# Patient Record
Sex: FEMALE | Race: WHITE | Employment: UNEMPLOYED | URBAN - METROPOLITAN AREA
[De-identification: names, ages, dates, MRNs, and addresses within clinical notes are randomized per-mention and may not be internally consistent; named-entity substitution may affect disease eponyms.]

---

## 2018-07-12 ENCOUNTER — OFFICE VISIT (OUTPATIENT)
Dept: ORTHOPEDIC SURGERY | Age: 63
End: 2018-07-12

## 2018-07-12 VITALS
WEIGHT: 205 LBS | BODY MASS INDEX: 29.35 KG/M2 | SYSTOLIC BLOOD PRESSURE: 148 MMHG | HEIGHT: 70 IN | HEART RATE: 76 BPM | DIASTOLIC BLOOD PRESSURE: 89 MMHG

## 2018-07-12 DIAGNOSIS — M17.0 BILATERAL PRIMARY OSTEOARTHRITIS OF KNEE: ICD-10-CM

## 2018-07-12 DIAGNOSIS — M25.562 LEFT KNEE PAIN, UNSPECIFIED CHRONICITY: Primary | ICD-10-CM

## 2018-07-12 PROCEDURE — 99203 OFFICE O/P NEW LOW 30 MIN: CPT | Performed by: ORTHOPAEDIC SURGERY

## 2018-07-12 RX ORDER — MAGNESIUM OXIDE 400 MG/1
400 TABLET ORAL DAILY
COMMUNITY
End: 2018-11-27 | Stop reason: ALTCHOICE

## 2018-07-12 NOTE — PROGRESS NOTES
Review of Systems   Constitutional:        Weight gain     HENT:        Sinus     Cardiovascular: Positive for leg swelling. Gastrointestinal:        Hemorrhoids     Genitourinary:        Incontinence     Musculoskeletal: Positive for joint pain and neck pain. Neurological: Positive for headaches. Chronic     All other systems reviewed and are negative.

## 2018-07-12 NOTE — PROGRESS NOTES
as on the form. Past History:  History reviewed. No pertinent past medical history. Past Surgical History:   Procedure Laterality Date    HYSTERECTOMY, VAGINAL      KIDNEY SURGERY Left      No current outpatient prescriptions on file prior to visit. No current facility-administered medications on file prior to visit. Social History     Social History    Marital status:      Spouse name: N/A    Number of children: N/A    Years of education: N/A     Occupational History    Not on file. Social History Main Topics    Smoking status: Never Smoker    Smokeless tobacco: Never Used    Alcohol use Not on file    Drug use: Unknown    Sexual activity: Not on file     Other Topics Concern    Not on file     Social History Narrative    No narrative on file     History reviewed. No pertinent family history. Current Medications:    Current Outpatient Prescriptions   Medication Sig Dispense Refill    Naproxen Sodium (ALEVE PO) Take by mouth      Cetirizine HCl (ZYRTEC ALLERGY PO) Take 10 mg by mouth      magnesium oxide (MAG-OX) 400 MG tablet Take 400 mg by mouth daily      Potassium 99 MG TABS Take by mouth      Glucosamine-Chondroit-Vit C-Mn (GLUCOSAMINE 1500 COMPLEX PO) Take by mouth       No current facility-administered medications for this visit. Allergies:  No Known Allergies    Physical Exam:  Vitals:    07/12/18 1426   BP: (!) 148/89   Pulse: 76     General: Frantz Ramos is a 66y.o. year old female who is sitting comfortably in our office in no acute distress. Alert and oriented. Neuro: alert. oriented  Eyes: Extra-ocular muscles intact  Mouth: Oral mucosa moist. No perioral lesions  Pulm: Respirations unlabored and regular. Heart: Regular rate and rhythm   Skin: warm, well perfused    Left Knee Exam:   Overall alignment is appreciated. Within normal limits.      Gait/Alignment: 8° valgus alignment that corrects with stress      Patella tracking: No J sign. answered.       Board Certified Orthopaedic Surgeon  [facility] and [facility] [address]  President and [address] [employer]  Professor of [address]

## 2018-07-16 ENCOUNTER — TELEPHONE (OUTPATIENT)
Dept: ORTHOPEDIC SURGERY | Age: 63
End: 2018-07-16

## 2018-07-24 ENCOUNTER — PRE-EVALUATION (OUTPATIENT)
Dept: ORTHOPEDIC SURGERY | Age: 63
End: 2018-07-24

## 2018-07-24 ENCOUNTER — OFFICE VISIT (OUTPATIENT)
Dept: ORTHOPEDIC SURGERY | Age: 63
End: 2018-07-24

## 2018-07-24 VITALS
HEIGHT: 70 IN | DIASTOLIC BLOOD PRESSURE: 78 MMHG | SYSTOLIC BLOOD PRESSURE: 126 MMHG | HEART RATE: 82 BPM | BODY MASS INDEX: 29.35 KG/M2 | WEIGHT: 205 LBS

## 2018-07-24 DIAGNOSIS — M17.0 BILATERAL PRIMARY OSTEOARTHRITIS OF KNEE: Primary | ICD-10-CM

## 2018-07-24 DIAGNOSIS — M25.562 LEFT KNEE PAIN, UNSPECIFIED CHRONICITY: ICD-10-CM

## 2018-07-24 PROCEDURE — 99214 OFFICE O/P EST MOD 30 MIN: CPT | Performed by: ORTHOPAEDIC SURGERY

## 2018-07-24 PROCEDURE — APPSS30 APP SPLIT SHARED TIME 16-30 MINUTES: Performed by: PHYSICIAN ASSISTANT

## 2018-07-24 PROCEDURE — APPSS15 APP SPLIT SHARED TIME 0-15 MINUTES: Performed by: PHYSICIAN ASSISTANT

## 2018-07-31 ENCOUNTER — HOSPITAL ENCOUNTER (OUTPATIENT)
Age: 63
Discharge: HOME | End: 2018-07-31
Payer: COMMERCIAL

## 2018-07-31 ENCOUNTER — HOSPITAL ENCOUNTER (OUTPATIENT)
Dept: GENERAL RADIOLOGY | Age: 63
Discharge: HOME | End: 2018-07-31
Payer: COMMERCIAL

## 2018-07-31 DIAGNOSIS — M17.12 ARTHRITIS OF KNEE, LEFT: ICD-10-CM

## 2018-07-31 PROCEDURE — 77073 BONE LENGTH STUDIES: CPT

## 2018-08-01 DIAGNOSIS — M17.12 PRIMARY OSTEOARTHRITIS OF LEFT KNEE: Primary | ICD-10-CM

## 2018-08-02 ENCOUNTER — TELEPHONE (OUTPATIENT)
Dept: ORTHOPEDIC SURGERY | Age: 63
End: 2018-08-02

## 2018-08-22 ENCOUNTER — HOSPITAL ENCOUNTER (OUTPATIENT)
Dept: PREADMISSION TESTING | Age: 63
Discharge: HOME | End: 2018-08-26
Payer: COMMERCIAL

## 2018-08-22 VITALS
BODY MASS INDEX: 28.63 KG/M2 | HEIGHT: 70 IN | RESPIRATION RATE: 16 BRPM | HEART RATE: 76 BPM | SYSTOLIC BLOOD PRESSURE: 134 MMHG | TEMPERATURE: 97.2 F | OXYGEN SATURATION: 97 % | WEIGHT: 200 LBS | DIASTOLIC BLOOD PRESSURE: 83 MMHG

## 2018-08-22 LAB
ALBUMIN SERPL-MCNC: 4.4 G/DL (ref 3.4–5)
ALP BLD-CCNC: 158 U/L (ref 40–129)
ALT SERPL-CCNC: 23 U/L (ref 10–40)
ANION GAP SERPL CALCULATED.3IONS-SCNC: 9 MMOL/L (ref 3–16)
APTT: 32 SEC (ref 26–36)
AST SERPL-CCNC: 19 U/L (ref 15–37)
BILIRUB SERPL-MCNC: 0.3 MG/DL (ref 0–1)
BILIRUBIN DIRECT: <0.2 MG/DL (ref 0–0.3)
BILIRUBIN, INDIRECT: ABNORMAL MG/DL (ref 0–1)
BUN BLDV-MCNC: 13 MG/DL (ref 7–20)
CALCIUM SERPL-MCNC: 9.8 MG/DL (ref 8.3–10.6)
CHLORIDE BLD-SCNC: 104 MMOL/L (ref 99–110)
CO2: 27 MMOL/L (ref 21–32)
CREAT SERPL-MCNC: 0.7 MG/DL (ref 0.6–1.2)
EKG ATRIAL RATE: 74 BPM
EKG DIAGNOSIS: NORMAL
EKG P AXIS: 52 DEGREES
EKG P-R INTERVAL: 154 MS
EKG Q-T INTERVAL: 398 MS
EKG QRS DURATION: 82 MS
EKG QTC CALCULATION (BAZETT): 441 MS
EKG R AXIS: 22 DEGREES
EKG T AXIS: 40 DEGREES
EKG VENTRICULAR RATE: 74 BPM
GFR AFRICAN AMERICAN: >60
GFR NON-AFRICAN AMERICAN: >60
GLUCOSE BLD-MCNC: 82 MG/DL (ref 70–99)
INR BLD: 0.89 (ref 0.86–1.14)
POTASSIUM SERPL-SCNC: 4.5 MMOL/L (ref 3.5–5.1)
PROTHROMBIN TIME: 10.2 SEC (ref 9.8–13)
SODIUM BLD-SCNC: 140 MMOL/L (ref 136–145)
TOTAL PROTEIN: 6.9 G/DL (ref 6.4–8.2)

## 2018-08-22 PROCEDURE — 80048 BASIC METABOLIC PNL TOTAL CA: CPT

## 2018-08-22 PROCEDURE — 85730 THROMBOPLASTIN TIME PARTIAL: CPT

## 2018-08-22 PROCEDURE — 87641 MR-STAPH DNA AMP PROBE: CPT

## 2018-08-22 PROCEDURE — 85610 PROTHROMBIN TIME: CPT

## 2018-08-22 PROCEDURE — 80076 HEPATIC FUNCTION PANEL: CPT

## 2018-08-22 RX ORDER — ACETAMINOPHEN 500 MG
1000 TABLET ORAL EVERY 6 HOURS PRN
Status: ON HOLD | COMMUNITY
End: 2018-12-20 | Stop reason: HOSPADM

## 2018-08-22 ASSESSMENT — PAIN SCALES - GENERAL: PAINLEVEL_OUTOF10: 6

## 2018-08-22 ASSESSMENT — PAIN DESCRIPTION - LOCATION: LOCATION: KNEE

## 2018-08-22 ASSESSMENT — PAIN DESCRIPTION - ORIENTATION: ORIENTATION: LEFT

## 2018-08-22 ASSESSMENT — PAIN DESCRIPTION - FREQUENCY: FREQUENCY: CONTINUOUS

## 2018-08-22 ASSESSMENT — PAIN DESCRIPTION - DESCRIPTORS: DESCRIPTORS: ACHING

## 2018-08-22 NOTE — PROGRESS NOTES
effect during this procedure. I give my doctor permission to give me blood or blood products. I understand that there are risks with receiving blood such as hepatitis, AIDS, fever, or allergic reaction. I acknowledge that the risks, benefits, and alternatives of this treatment have been explained to me and that no express or implied warranty has been given by the hospital, any blood bank, or any person or entity as to the blood or blood components transfused. At the discretion of my doctor, I agree to allow observers, equipment/product representatives and allow photographing, and/or televising of the procedure, provided my name or identity is maintained confidentially. I agree the hospital may dispose of or use for scientific or educational purposes any tissue, fluid, or body parts which may be removed.     ________________________________Date________Time______ am/pm  ([place] [facility])  Patient or Signature of Closest Relative or Legal Guardian    ________________________________Date________Time______am/pm      Page 1 of  1  Witness
you for the first 24 hours after discharge. 3. You and your family will be given written instructions about your diet, activity, dressing care, medications, and return visits. 4. Once at home, should issues with nausea, pain, or bleeding occur, or should you notice any signs of infection, you should call your surgeon. 5. Always clean your hands before and after caring for your wound. Do not let your family touch your surgery site without cleaning their hands. 6. Narcotic pain medications can cause significant constipation. You may want to add a stool softener to your postoperative medication schedule or speak to your surgeon on how best to manage this SIDE EFFECT. SPECIAL INSTRUCTIONS     Thank you for allowing us to care for you. We strive to exceed your expectations in the overall delivery of care and service provided to you and your family. If you need to contact us for any reason, please call us at [phone]. Instructions reviewed and copy given to patient during preadmission testing visit. [employer]. 8/22/2018 .9:52 AM      ADDITIONAL EDUCATIONAL INFORMATION REVIEWED / PROVIDED TO YOU AND YOUR FAMILY:  Yes Taking Control of Your Pain   Yes FAQs about Surgical Site Infections  Yes Hibiclens® Bathing Instructions  Yes Incentive Spirometer given to patient- PLEASE BRING THIS SPIROMETER BACK WITH YOU ON THE DAY OF YOUR SURGERY   Yes Your Guide to Knee Replacement Surgery. PLEASE BRING THIS BOOKLET BACK ON THE DAY OF YOUR SURGERY.   Yes  Reviewed/Given handout for [facility] Video/Class-attending class

## 2018-08-23 ENCOUNTER — OFFICE VISIT (OUTPATIENT)
Dept: ORTHOPEDIC SURGERY | Age: 63
End: 2018-08-23

## 2018-08-23 ENCOUNTER — HOSPITAL ENCOUNTER (OUTPATIENT)
Dept: PHYSICAL THERAPY | Age: 63
Setting detail: THERAPIES SERIES
Discharge: HOME | End: 2018-08-23
Payer: COMMERCIAL

## 2018-08-23 VITALS
BODY MASS INDEX: 28.63 KG/M2 | HEIGHT: 70 IN | DIASTOLIC BLOOD PRESSURE: 46 MMHG | SYSTOLIC BLOOD PRESSURE: 118 MMHG | WEIGHT: 200 LBS | HEART RATE: 71 BPM

## 2018-08-23 DIAGNOSIS — M17.12 PRIMARY OSTEOARTHRITIS OF LEFT KNEE: Primary | ICD-10-CM

## 2018-08-23 LAB — MRSA SCREEN RT-PCR: NORMAL

## 2018-08-23 PROCEDURE — G8979 MOBILITY GOAL STATUS: HCPCS

## 2018-08-23 PROCEDURE — 97162 PT EVAL MOD COMPLEX 30 MIN: CPT

## 2018-08-23 PROCEDURE — 97110 THERAPEUTIC EXERCISES: CPT

## 2018-08-23 PROCEDURE — MISCCOLD COLD THERAPY UNIT AND PAD: Performed by: ORTHOPAEDIC SURGERY

## 2018-08-23 PROCEDURE — G8978 MOBILITY CURRENT STATUS: HCPCS

## 2018-08-23 PROCEDURE — MISC250 COMPRESSION STOCKING: Performed by: ORTHOPAEDIC SURGERY

## 2018-08-23 PROCEDURE — E0135 WALKER FOLDING ADJUST/FIXED: HCPCS | Performed by: ORTHOPAEDIC SURGERY

## 2018-08-23 PROCEDURE — PREOPEXAM PRE-OP EXAM: Performed by: ORTHOPAEDIC SURGERY

## 2018-08-23 PROCEDURE — 97530 THERAPEUTIC ACTIVITIES: CPT

## 2018-08-23 RX ORDER — OXYCODONE HYDROCHLORIDE AND ACETAMINOPHEN 5; 325 MG/1; MG/1
1 TABLET ORAL EVERY 6 HOURS PRN
Qty: 28 TABLET | Refills: 0 | Status: SHIPPED | OUTPATIENT
Start: 2018-08-23 | End: 2018-08-30

## 2018-08-23 NOTE — PROGRESS NOTES
Chief Complaint    Pre-op Exam (Lt knee)      History of Present Illness:  Hernando Danielson is a 66 y.o. female who presents for follow up of her left knee. The patient is here today for a pre-operative appointment for a left total knee arthroplasty. The patient has been treated conservatively for left knee osteoarthritis for some time now. The patient has traveled here from [address] [place] [address] to get surgery by Dr. Vaughn Glez. She has exhausted all non-operative treatment and is ready for her knee to feel better. The patient is being seen in the clinic today for their pre-op examination. The patient is in good health. The patient has no history of blood clots, no organ dysfunction, not diabetes, no known allergies to medications, tolerates pain medications and is not on any estrogen products. The patient recently had a physical from her PCP and was cleared for surgery and stated being in good health. Past Medical History:   Diagnosis Date    Arthritis         Past Surgical History:   Procedure Laterality Date    COLONOSCOPY      HYSTERECTOMY, VAGINAL      KIDNEY SURGERY Left     TONSILLECTOMY AND ADENOIDECTOMY         No family history on file. Social History     Social History    Marital status:      Spouse name: N/A    Number of children: N/A    Years of education: N/A     Social History Main Topics    Smoking status: Never Smoker    Smokeless tobacco: Never Used    Alcohol use No    Drug use: No    Sexual activity: Not on file     Other Topics Concern    Not on file     Social History Narrative    No narrative on file       Current Outpatient Prescriptions   Medication Sig Dispense Refill    oxyCODONE-acetaminophen (PERCOCET) 5-325 MG per tablet Take 1 tablet by mouth every 6 hours as needed for Pain for up to 7 days. Intended supply: 7 days. Take lowest dose possible to manage pain.  28 tablet 0    acetaminophen (TYLENOL) 500 MG tablet Take 1,000 mg by mouth every 6 hours as needed for Pain      Cetirizine HCl (ZYRTEC ALLERGY PO) Take 10 mg by mouth daily       magnesium oxide (MAG-OX) 400 MG tablet Take 400 mg by mouth daily      Potassium 99 MG TABS Take by mouth daily       Naproxen Sodium (ALEVE PO) Take by mouth       No current facility-administered medications for this visit. No Known Allergies    Review of Systems:  A 14 point review of systems was completed by the patient and is available in the media section of the scanned medical record and was reviewed on 8/23/2018. The review is negative with the exception of those things mentioned in the HPI and Past Medical History     Vital Signs:   BP (!) 118/46   Pulse 71   Ht 5' 10\" (1.778 m)   Wt 200 lb (90.7 kg)   BMI 28.70 kg/m²     General Exam:   Mental Status: The patient is oriented to time, place and person. The patient's mood and affect are appropriate. Neurological: The patient has good coordination. There is no weakness or sensory deficit. Knee Examination: LEFT    Skin:  There are no skin lesions, cellulitis, or extreme edema in the lower extremities. Sensation is grossly intact to light touch bilaterally lower extremity. The patient has warm and well-perfused Bilateral lower extremities with brisk capillary refill. Inspection:  No effusion,  no gross deformities, no signs of infection. Palpation: There is patellofemoral crepitus, there is joint line tenderness    Range of Motion:  0-130    Strength: Motor is grossly intact    Special Tests:  No ligament instability     Gait: Antalgic    Alignment: Varus deformity    Radiology:     None taken today. COLD THERAPY UNIT AND PAD  COMPRESSION STOCKING  WALKER MEDLINE      Office Procedures:  Orders Placed This Encounter   Procedures    Cold Therapy Unit and Pad $150     Scheduling Instructions:      Patient was supplied a Cold Therapy Unit and Pad. This retail item was supplied to provide functional support and assist in protecting the affected area. Verbal and written instructions for the use of and application of this item were provided. The patient was educated and fit by a healthcare professional with expert knowledge and specialization in brace application. They were instructed to contact the office immediately should the equipment result       in increased pain, decreased sensation, increased swelling or worsening of the condition.  Compression Stocking $30     Patient was supplied a Compression Sleeve. This retail item was supplied to provide functional support and assist in controlling swelling in the lower extremities. Verbal and written instructions for the use of and application of this item were provided. The patient was educated and fit by a healthcare professional with expert knowledge and specialization in brace application. They were instructed to contact the office immediately should the equipment result in increased pain, decreased sensation, increased swelling or worsening of the condition. [facility] Walker Medline     Patient was prescribed a Medline Walker. This mobility device is required for the following reasons:    Patient has mobility limitations that significantly impair their ability to participate in one or more mobility related activities of daily living. The patient is able to safely use the mobility device. Functional mobility deficit can be sufficiently resolved with the use of this device. Verbal and written instructions for the use of and application of this item were provided. The patient was educated and fit by a healthcare professional with expert knowledge and specialization in brace application while under the direct supervision of the treating physician. They were instructed to contact the office immediately should the equipment result in increased pain, decreased sensation, increased swelling or worsening of the condition.             Assessment: A 66 y.o. female who presents with left knee primary [id number]. Dosages and further duration of the pain medication will be re-evaluated at her post op visit. @ was educated on dosing expectations and limits of prescribing as a result of the new [facility] Board rules enacted August 31, 2017. Please also note that this is not the initial opioid prescription issued to her but a continuation of medication utilized during the hospital admission as noted in the medical record. OARRS report has also been utilized to screen for any abuse history or suspicious activity as outlined in [place]. All efforts have been taken to prevent abuse potential and misuse of opioid medications. Follow up in: PT    [unfilled]  3:07 PM      ISandra ATC am scribing for and in the presence of Dr. Juana Garcia. 08/23/18 3:07 PM   Sandra Carroll ATC    This dictation was performed with a verbal recognition program (DRAGON) and it was checked for errors. It is possible that there are still dictated errors within this office note. If so, please bring any errors to my attention for an addendum. All efforts were made to ensure that this office note is accurate. This dictation was performed with a verbal recognition program (DRAGON) and it was checked for errors. It is possible that there are still dictated errors within this office note. If so, please bring any errors to my attention for an addendum. All efforts were made to ensure that this office note is accurate. Supervising Physician Attestation:  I, Dr. Juana Garcia, personally performed the services described in this documentation as scribed above, and it is both accurate and complete and I agree with all pertinent clinical information. I personally interviewed the patient and performed a physical examination and medical decision making. I discussed the patient's condition and treatment options and have  reviewed and agree with the past medical, family and social history unless otherwise noted.

## 2018-08-23 NOTE — PLAN OF CARE
The [address] [place] [address] and [address]                                                       Physical Therapy Certification    Dear Referring Practitioner: Vaughn Glez,    We had the pleasure of evaluating the following patient for physical therapy services at [address] [place] [address]. A summary of our findings can be found in the initial assessment below. This includes our plan of care. If you have any questions or concerns regarding these findings, please do not hesitate to contact me at the office phone number checked above. Thank you for the referral.       Physician Signature:_______________________________Date:__________________  By signing above (or electronic signature), therapists plan is approved by physician      Patient: Hernando Danielson   : 1955   MRN: [id number]  Referring Physician: Referring Practitioner: Vaughn Glez      Evaluation Date: 2018      Medical Diagnosis Information:  Diagnosis: M17.12 (ICD-10-CM) - Primary osteoarthritis of left knee   Treatment Diagnosis: Decreased functional mobility ; Decreased ADL status; Decreased ROM; Decreased strength;Decreased endurance;Decreased balance; Insurance information: PT Insurance Information: PT 2018 BENEFITS/ANTHEM/ ACTIVE/ DED 3450 .37/ OOP 6900 .66/ PAYS 100%/ 20 VPCY/ NO AUTH REQ/ [id number]  18      Precautions/ Contra-indications: OA in bilateral knees  Latex Allergy:  [x]NO      []YES  Preferred Language for Healthcare:   [x]English       []other:    SUBJECTIVE: Patient stated complaint: 66year old female presents today for pre-op visit for Left TKR. She reports that for the past few years she began to have symptoms in her left knee and recently they have worsened.  She denies any CHRISTIANO but she does note she used to do different type of aerobics exercising including comorbidities that impact the plan of care;  [x]1-2 personal factors and/or comorbidities that impact the plan of care  []3 personal factors and/or comorbidities that impact the plan of care  [x] An examination of body systems using standardized tests and measures addressing any of the following: body structures and functions (impairments), activity limitations, and/or participation restrictions;:  [] a total of 1-2 or more elements   [] a total of 3 or more elements   [x] a total of 4 or more elements   [x] A clinical presentation with:  [] stable and/or uncomplicated characteristics   [x] evolving clinical presentation with changing characteristics  [] unstable and unpredictable characteristics;   [x] Clinical decision making of [] low, [x] moderate, [] high complexity using standardized patient assessment instrument and/or measurable assessment of functional outcome. [] EVAL (LOW) 39957 (typically 20 minutes face-to-face)  [x] EVAL (MOD) 53654 (typically 30 minutes face-to-face)  [] EVAL (HIGH) 03912 (typically 45 minutes face-to-face)  [] RE-EVAL       PLAN  Frequency/Duration:  2 days per week for 12 Weeks:  Interventions:  [x]  Therapeutic exercise including: strength training, ROM, for Lower extremity and core   [x]  NMR activation and proprioception for LE, Glutes and Core   [x]  Manual therapy as indicated for LE, Hip and spine to include: Dry Needling/IASTM, STM, PROM, Gr I-IV mobilizations, manipulation. [x] Modalities as needed that may include: thermal agents, E-stim, Biofeedback, US, iontophoresis as indicated  [x] Patient education on joint protection, postural re-education, activity modification, progression of HEP. HEP instruction:   Patient instructed on HEP on this date with handout provided and all questions answered. Patient was instructed to contact PT with any complications or questions about HEP moving forward. Patient verbally stated she understood.  (see scanned

## 2018-08-27 ENCOUNTER — HOSPITAL ENCOUNTER (OUTPATIENT)
Age: 63
Setting detail: OBSERVATION
LOS: 1 days | Discharge: HOME | End: 2018-08-28
Attending: ORTHOPAEDIC SURGERY | Admitting: ORTHOPAEDIC SURGERY
Payer: COMMERCIAL

## 2018-08-27 ENCOUNTER — ANESTHESIA EVENT (OUTPATIENT)
Dept: OPERATING ROOM | Age: 63
End: 2018-08-27
Payer: COMMERCIAL

## 2018-08-27 ENCOUNTER — ANESTHESIA (OUTPATIENT)
Dept: OPERATING ROOM | Age: 63
End: 2018-08-27
Payer: COMMERCIAL

## 2018-08-27 VITALS
OXYGEN SATURATION: 99 % | DIASTOLIC BLOOD PRESSURE: 79 MMHG | TEMPERATURE: 96.6 F | RESPIRATION RATE: 11 BRPM | SYSTOLIC BLOOD PRESSURE: 146 MMHG

## 2018-08-27 PROBLEM — Z98.890 POSTOPERATIVE STATE: Status: ACTIVE | Noted: 2018-08-27

## 2018-08-27 PROBLEM — M17.12 OSTEOARTHRITIS OF LEFT KNEE: Status: ACTIVE | Noted: 2018-08-27

## 2018-08-27 LAB
A/G RATIO: 1.5 (ref 1.1–2.2)
ALBUMIN SERPL-MCNC: 3.7 G/DL (ref 3.4–5)
ALP BLD-CCNC: 130 U/L (ref 40–129)
ALT SERPL-CCNC: 18 U/L (ref 10–40)
ANION GAP SERPL CALCULATED.3IONS-SCNC: 10 MMOL/L (ref 3–16)
AST SERPL-CCNC: 17 U/L (ref 15–37)
BILIRUB SERPL-MCNC: 0.3 MG/DL (ref 0–1)
BUN BLDV-MCNC: 15 MG/DL (ref 7–20)
CALCIUM SERPL-MCNC: 8.7 MG/DL (ref 8.3–10.6)
CHLORIDE BLD-SCNC: 106 MMOL/L (ref 99–110)
CO2: 25 MMOL/L (ref 21–32)
CREAT SERPL-MCNC: 0.6 MG/DL (ref 0.6–1.2)
GFR AFRICAN AMERICAN: >60
GFR NON-AFRICAN AMERICAN: >60
GLOBULIN: 2.4 G/DL
GLUCOSE BLD-MCNC: 147 MG/DL (ref 70–99)
GLUCOSE BLD-MCNC: 151 MG/DL (ref 70–99)
GLUCOSE BLD-MCNC: 169 MG/DL (ref 70–99)
HCT VFR BLD CALC: 41.1 % (ref 36–48)
HEMOGLOBIN: 13.9 G/DL (ref 12–16)
MCH RBC QN AUTO: 27.6 PG (ref 26–34)
MCHC RBC AUTO-ENTMCNC: 33.7 G/DL (ref 31–36)
MCV RBC AUTO: 82.1 FL (ref 80–100)
PDW BLD-RTO: 13.2 % (ref 12.4–15.4)
PERFORMED ON: ABNORMAL
PERFORMED ON: ABNORMAL
PLATELET # BLD: 209 K/UL (ref 135–450)
PMV BLD AUTO: 7.4 FL (ref 5–10.5)
POTASSIUM SERPL-SCNC: 4.8 MMOL/L (ref 3.5–5.1)
RBC # BLD: 5.01 M/UL (ref 4–5.2)
SODIUM BLD-SCNC: 141 MMOL/L (ref 136–145)
TOTAL PROTEIN: 6.1 G/DL (ref 6.4–8.2)
WBC # BLD: 6 K/UL (ref 4–11)

## 2018-08-27 PROCEDURE — 3700000001 HC ADD 15 MINUTES (ANESTHESIA): Performed by: ORTHOPAEDIC SURGERY

## 2018-08-27 PROCEDURE — G0378 HOSPITAL OBSERVATION PER HR: HCPCS

## 2018-08-27 PROCEDURE — 2500000003 HC RX 250 WO HCPCS: Performed by: ORTHOPAEDIC SURGERY

## 2018-08-27 PROCEDURE — 85027 COMPLETE CBC AUTOMATED: CPT

## 2018-08-27 PROCEDURE — 6360000002 HC RX W HCPCS: Performed by: NURSE ANESTHETIST, CERTIFIED REGISTERED

## 2018-08-27 PROCEDURE — 7100000001 HC PACU RECOVERY - ADDTL 15 MIN: Performed by: ORTHOPAEDIC SURGERY

## 2018-08-27 PROCEDURE — 2580000003 HC RX 258: Performed by: ORTHOPAEDIC SURGERY

## 2018-08-27 PROCEDURE — 2580000003 HC RX 258: Performed by: INTERNAL MEDICINE

## 2018-08-27 PROCEDURE — 94150 VITAL CAPACITY TEST: CPT

## 2018-08-27 PROCEDURE — 2580000003 HC RX 258: Performed by: ANESTHESIOLOGY

## 2018-08-27 PROCEDURE — 2720000010 HC SURG SUPPLY STERILE: Performed by: ORTHOPAEDIC SURGERY

## 2018-08-27 PROCEDURE — 2700000000 HC OXYGEN THERAPY PER DAY

## 2018-08-27 PROCEDURE — 94664 DEMO&/EVAL PT USE INHALER: CPT

## 2018-08-27 PROCEDURE — 6360000002 HC RX W HCPCS: Performed by: FAMILY MEDICINE

## 2018-08-27 PROCEDURE — 3600000004 HC SURGERY LEVEL 4 BASE: Performed by: ORTHOPAEDIC SURGERY

## 2018-08-27 PROCEDURE — 6360000002 HC RX W HCPCS: Performed by: ORTHOPAEDIC SURGERY

## 2018-08-27 PROCEDURE — 6360000002 HC RX W HCPCS: Performed by: ANESTHESIOLOGY

## 2018-08-27 PROCEDURE — 7100000000 HC PACU RECOVERY - FIRST 15 MIN: Performed by: ORTHOPAEDIC SURGERY

## 2018-08-27 PROCEDURE — 36415 COLL VENOUS BLD VENIPUNCTURE: CPT

## 2018-08-27 PROCEDURE — C1776 JOINT DEVICE (IMPLANTABLE): HCPCS | Performed by: ORTHOPAEDIC SURGERY

## 2018-08-27 PROCEDURE — 2709999900 HC NON-CHARGEABLE SUPPLY: Performed by: ORTHOPAEDIC SURGERY

## 2018-08-27 PROCEDURE — 3600000014 HC SURGERY LEVEL 4 ADDTL 15MIN: Performed by: ORTHOPAEDIC SURGERY

## 2018-08-27 PROCEDURE — 6370000000 HC RX 637 (ALT 250 FOR IP): Performed by: ORTHOPAEDIC SURGERY

## 2018-08-27 PROCEDURE — 2500000003 HC RX 250 WO HCPCS: Performed by: NURSE ANESTHETIST, CERTIFIED REGISTERED

## 2018-08-27 PROCEDURE — 80053 COMPREHEN METABOLIC PANEL: CPT

## 2018-08-27 PROCEDURE — C1713 ANCHOR/SCREW BN/BN,TIS/BN: HCPCS | Performed by: ORTHOPAEDIC SURGERY

## 2018-08-27 PROCEDURE — 3700000000 HC ANESTHESIA ATTENDED CARE: Performed by: ORTHOPAEDIC SURGERY

## 2018-08-27 PROCEDURE — 94761 N-INVAS EAR/PLS OXIMETRY MLT: CPT

## 2018-08-27 DEVICE — JOURNEY II BCS XLPE ARTICULAR                                    INSERT SIZE 3-4 LEFT 12MM
Type: IMPLANTABLE DEVICE | Site: KNEE | Status: FUNCTIONAL
Brand: JOURNEY

## 2018-08-27 DEVICE — JOURNEY II BCS FEMORAL OXINIUM                                    LEFT SIZE 5
Type: IMPLANTABLE DEVICE | Site: KNEE | Status: FUNCTIONAL
Brand: JOURNEY

## 2018-08-27 DEVICE — COMPONENT TOT KNEE CAPPED ADV OXIN NP JOURNEY II: Type: IMPLANTABLE DEVICE | Site: KNEE | Status: FUNCTIONAL

## 2018-08-27 DEVICE — JOURNEY 7.5 ROUND RESURF PAT 32MM STANDARD
Type: IMPLANTABLE DEVICE | Site: KNEE | Status: FUNCTIONAL
Brand: JOURNEY

## 2018-08-27 DEVICE — JOURNEY TIBIAL BASEPLATE NONPOROUS                                    LEFT SIZE 4
Type: IMPLANTABLE DEVICE | Site: KNEE | Status: FUNCTIONAL
Brand: JOURNEY

## 2018-08-27 RX ORDER — SODIUM CHLORIDE, SODIUM LACTATE, POTASSIUM CHLORIDE, CALCIUM CHLORIDE 600; 310; 30; 20 MG/100ML; MG/100ML; MG/100ML; MG/100ML
INJECTION, SOLUTION INTRAVENOUS CONTINUOUS
Status: DISCONTINUED | OUTPATIENT
Start: 2018-08-27 | End: 2018-08-28 | Stop reason: HOSPADM

## 2018-08-27 RX ORDER — CETIRIZINE HYDROCHLORIDE 10 MG/1
10 TABLET ORAL DAILY
Status: DISCONTINUED | OUTPATIENT
Start: 2018-08-27 | End: 2018-08-28 | Stop reason: HOSPADM

## 2018-08-27 RX ORDER — SODIUM CHLORIDE 0.9 % (FLUSH) 0.9 %
10 SYRINGE (ML) INJECTION PRN
Status: DISCONTINUED | OUTPATIENT
Start: 2018-08-27 | End: 2018-08-28 | Stop reason: HOSPADM

## 2018-08-27 RX ORDER — LIDOCAINE HYDROCHLORIDE 10 MG/ML
1 INJECTION, SOLUTION EPIDURAL; INFILTRATION; INTRACAUDAL; PERINEURAL
Status: DISCONTINUED | OUTPATIENT
Start: 2018-08-27 | End: 2018-08-27 | Stop reason: HOSPADM

## 2018-08-27 RX ORDER — FENTANYL CITRATE 50 UG/ML
INJECTION, SOLUTION INTRAMUSCULAR; INTRAVENOUS PRN
Status: DISCONTINUED | OUTPATIENT
Start: 2018-08-27 | End: 2018-08-27 | Stop reason: SDUPTHER

## 2018-08-27 RX ORDER — ACETAMINOPHEN 325 MG/1
650 TABLET ORAL EVERY 4 HOURS PRN
Status: DISCONTINUED | OUTPATIENT
Start: 2018-08-27 | End: 2018-08-28 | Stop reason: HOSPADM

## 2018-08-27 RX ORDER — HYDROMORPHONE HCL 110MG/55ML
PATIENT CONTROLLED ANALGESIA SYRINGE INTRAVENOUS PRN
Status: DISCONTINUED | OUTPATIENT
Start: 2018-08-27 | End: 2018-08-27 | Stop reason: SDUPTHER

## 2018-08-27 RX ORDER — DEXTROSE MONOHYDRATE 50 MG/ML
100 INJECTION, SOLUTION INTRAVENOUS PRN
Status: DISCONTINUED | OUTPATIENT
Start: 2018-08-27 | End: 2018-08-28 | Stop reason: HOSPADM

## 2018-08-27 RX ORDER — ONDANSETRON 2 MG/ML
INJECTION INTRAMUSCULAR; INTRAVENOUS PRN
Status: DISCONTINUED | OUTPATIENT
Start: 2018-08-27 | End: 2018-08-27 | Stop reason: SDUPTHER

## 2018-08-27 RX ORDER — ONDANSETRON 2 MG/ML
4 INJECTION INTRAMUSCULAR; INTRAVENOUS
Status: DISCONTINUED | OUTPATIENT
Start: 2018-08-27 | End: 2018-08-27 | Stop reason: HOSPADM

## 2018-08-27 RX ORDER — DOCUSATE SODIUM 100 MG/1
100 CAPSULE, LIQUID FILLED ORAL 2 TIMES DAILY
Status: DISCONTINUED | OUTPATIENT
Start: 2018-08-27 | End: 2018-08-28 | Stop reason: HOSPADM

## 2018-08-27 RX ORDER — DEXAMETHASONE SODIUM PHOSPHATE 4 MG/ML
INJECTION, SOLUTION INTRA-ARTICULAR; INTRALESIONAL; INTRAMUSCULAR; INTRAVENOUS; SOFT TISSUE PRN
Status: DISCONTINUED | OUTPATIENT
Start: 2018-08-27 | End: 2018-08-27 | Stop reason: SDUPTHER

## 2018-08-27 RX ORDER — 0.9 % SODIUM CHLORIDE 0.9 %
500 INTRAVENOUS SOLUTION INTRAVENOUS ONCE
Status: COMPLETED | OUTPATIENT
Start: 2018-08-27 | End: 2018-08-27

## 2018-08-27 RX ORDER — DEXTROSE MONOHYDRATE 25 G/50ML
12.5 INJECTION, SOLUTION INTRAVENOUS PRN
Status: DISCONTINUED | OUTPATIENT
Start: 2018-08-27 | End: 2018-08-28 | Stop reason: HOSPADM

## 2018-08-27 RX ORDER — MIDAZOLAM HYDROCHLORIDE 1 MG/ML
INJECTION INTRAMUSCULAR; INTRAVENOUS PRN
Status: DISCONTINUED | OUTPATIENT
Start: 2018-08-27 | End: 2018-08-27 | Stop reason: SDUPTHER

## 2018-08-27 RX ORDER — ONDANSETRON 2 MG/ML
4 INJECTION INTRAMUSCULAR; INTRAVENOUS EVERY 6 HOURS PRN
Status: DISCONTINUED | OUTPATIENT
Start: 2018-08-27 | End: 2018-08-28

## 2018-08-27 RX ORDER — FENTANYL CITRATE 50 UG/ML
50 INJECTION, SOLUTION INTRAMUSCULAR; INTRAVENOUS EVERY 5 MIN PRN
Status: DISCONTINUED | OUTPATIENT
Start: 2018-08-27 | End: 2018-08-27 | Stop reason: HOSPADM

## 2018-08-27 RX ORDER — SODIUM CHLORIDE 0.9 % (FLUSH) 0.9 %
10 SYRINGE (ML) INJECTION EVERY 12 HOURS SCHEDULED
Status: DISCONTINUED | OUTPATIENT
Start: 2018-08-27 | End: 2018-08-27 | Stop reason: HOSPADM

## 2018-08-27 RX ORDER — SODIUM CHLORIDE 9 MG/ML
INJECTION, SOLUTION INTRAVENOUS CONTINUOUS
Status: DISPENSED | OUTPATIENT
Start: 2018-08-27 | End: 2018-08-27

## 2018-08-27 RX ORDER — DEXTROSE MONOHYDRATE 25 G/50ML
12.5 INJECTION, SOLUTION INTRAVENOUS PRN
Status: DISCONTINUED | OUTPATIENT
Start: 2018-08-27 | End: 2018-08-27 | Stop reason: SDUPTHER

## 2018-08-27 RX ORDER — FENTANYL CITRATE 50 UG/ML
25 INJECTION, SOLUTION INTRAMUSCULAR; INTRAVENOUS EVERY 5 MIN PRN
Status: DISCONTINUED | OUTPATIENT
Start: 2018-08-27 | End: 2018-08-27 | Stop reason: HOSPADM

## 2018-08-27 RX ORDER — OXYCODONE HYDROCHLORIDE 5 MG/1
5 TABLET ORAL EVERY 4 HOURS PRN
Status: DISCONTINUED | OUTPATIENT
Start: 2018-08-27 | End: 2018-08-28

## 2018-08-27 RX ORDER — ROCURONIUM BROMIDE 10 MG/ML
INJECTION, SOLUTION INTRAVENOUS PRN
Status: DISCONTINUED | OUTPATIENT
Start: 2018-08-27 | End: 2018-08-27 | Stop reason: SDUPTHER

## 2018-08-27 RX ORDER — NICOTINE POLACRILEX 4 MG
15 LOZENGE BUCCAL PRN
Status: DISCONTINUED | OUTPATIENT
Start: 2018-08-27 | End: 2018-08-28 | Stop reason: HOSPADM

## 2018-08-27 RX ORDER — LABETALOL HYDROCHLORIDE 5 MG/ML
5 INJECTION, SOLUTION INTRAVENOUS EVERY 10 MIN PRN
Status: DISCONTINUED | OUTPATIENT
Start: 2018-08-27 | End: 2018-08-27 | Stop reason: HOSPADM

## 2018-08-27 RX ORDER — SODIUM CHLORIDE 0.9 % (FLUSH) 0.9 %
10 SYRINGE (ML) INJECTION EVERY 12 HOURS SCHEDULED
Status: DISCONTINUED | OUTPATIENT
Start: 2018-08-27 | End: 2018-08-28 | Stop reason: HOSPADM

## 2018-08-27 RX ORDER — OXYCODONE HYDROCHLORIDE 5 MG/1
10 TABLET ORAL EVERY 4 HOURS PRN
Status: DISCONTINUED | OUTPATIENT
Start: 2018-08-27 | End: 2018-08-28

## 2018-08-27 RX ORDER — SODIUM CHLORIDE 0.9 % (FLUSH) 0.9 %
10 SYRINGE (ML) INJECTION PRN
Status: DISCONTINUED | OUTPATIENT
Start: 2018-08-27 | End: 2018-08-27 | Stop reason: HOSPADM

## 2018-08-27 RX ORDER — DEXAMETHASONE SODIUM PHOSPHATE 4 MG/ML
10 INJECTION, SOLUTION INTRA-ARTICULAR; INTRALESIONAL; INTRAMUSCULAR; INTRAVENOUS; SOFT TISSUE EVERY 12 HOURS
Status: COMPLETED | OUTPATIENT
Start: 2018-08-27 | End: 2018-08-28

## 2018-08-27 RX ORDER — SODIUM CHLORIDE 9 MG/ML
INJECTION, SOLUTION INTRAVENOUS CONTINUOUS
Status: DISCONTINUED | OUTPATIENT
Start: 2018-08-27 | End: 2018-08-28 | Stop reason: HOSPADM

## 2018-08-27 RX ORDER — LIDOCAINE HYDROCHLORIDE 20 MG/ML
INJECTION, SOLUTION EPIDURAL; INFILTRATION; INTRACAUDAL; PERINEURAL PRN
Status: DISCONTINUED | OUTPATIENT
Start: 2018-08-27 | End: 2018-08-27 | Stop reason: SDUPTHER

## 2018-08-27 RX ORDER — PROPOFOL 10 MG/ML
INJECTION, EMULSION INTRAVENOUS PRN
Status: DISCONTINUED | OUTPATIENT
Start: 2018-08-27 | End: 2018-08-27 | Stop reason: SDUPTHER

## 2018-08-27 RX ORDER — HYDRALAZINE HYDROCHLORIDE 20 MG/ML
5 INJECTION INTRAMUSCULAR; INTRAVENOUS EVERY 10 MIN PRN
Status: DISCONTINUED | OUTPATIENT
Start: 2018-08-27 | End: 2018-08-27 | Stop reason: HOSPADM

## 2018-08-27 RX ORDER — DEXAMETHASONE SODIUM PHOSPHATE 4 MG/ML
6 INJECTION, SOLUTION INTRA-ARTICULAR; INTRALESIONAL; INTRAMUSCULAR; INTRAVENOUS; SOFT TISSUE EVERY 12 HOURS
Status: DISCONTINUED | OUTPATIENT
Start: 2018-08-28 | End: 2018-08-28 | Stop reason: HOSPADM

## 2018-08-27 RX ADMIN — ONDANSETRON 4 MG: 2 INJECTION INTRAMUSCULAR; INTRAVENOUS at 15:16

## 2018-08-27 RX ADMIN — MIDAZOLAM HYDROCHLORIDE 2 MG: 1 INJECTION INTRAMUSCULAR; INTRAVENOUS at 07:33

## 2018-08-27 RX ADMIN — SUGAMMADEX 200 MG: 100 INJECTION, SOLUTION INTRAVENOUS at 09:58

## 2018-08-27 RX ADMIN — ASPIRIN 325 MG: 325 TABLET, DELAYED RELEASE ORAL at 14:58

## 2018-08-27 RX ADMIN — SODIUM CHLORIDE: 9 INJECTION, SOLUTION INTRAVENOUS at 17:17

## 2018-08-27 RX ADMIN — PROPOFOL 150 MG: 10 INJECTION, EMULSION INTRAVENOUS at 07:41

## 2018-08-27 RX ADMIN — Medication 10 ML: at 21:13

## 2018-08-27 RX ADMIN — DEXAMETHASONE SODIUM PHOSPHATE 10 MG: 4 INJECTION, SOLUTION INTRAMUSCULAR; INTRAVENOUS at 07:54

## 2018-08-27 RX ADMIN — CEFAZOLIN SODIUM 2 G: 10 INJECTION, POWDER, FOR SOLUTION INTRAVENOUS at 16:38

## 2018-08-27 RX ADMIN — SODIUM CHLORIDE, POTASSIUM CHLORIDE, SODIUM LACTATE AND CALCIUM CHLORIDE: 600; 310; 30; 20 INJECTION, SOLUTION INTRAVENOUS at 05:59

## 2018-08-27 RX ADMIN — DEXAMETHASONE SODIUM PHOSPHATE 10 MG: 4 INJECTION, SOLUTION INTRA-ARTICULAR; INTRALESIONAL; INTRAMUSCULAR; INTRAVENOUS; SOFT TISSUE at 14:58

## 2018-08-27 RX ADMIN — Medication 2 G: at 07:44

## 2018-08-27 RX ADMIN — TRANEXAMIC ACID 1000 MG: 100 INJECTION, SOLUTION INTRAVENOUS at 09:14

## 2018-08-27 RX ADMIN — HYDROMORPHONE HYDROCHLORIDE 0.5 MG: 1 INJECTION, SOLUTION INTRAMUSCULAR; INTRAVENOUS; SUBCUTANEOUS at 11:13

## 2018-08-27 RX ADMIN — SODIUM CHLORIDE 500 ML: 9 INJECTION, SOLUTION INTRAVENOUS at 14:10

## 2018-08-27 RX ADMIN — ASPIRIN 325 MG: 325 TABLET, DELAYED RELEASE ORAL at 21:12

## 2018-08-27 RX ADMIN — HYDROMORPHONE HYDROCHLORIDE 0.5 MG: 1 INJECTION, SOLUTION INTRAMUSCULAR; INTRAVENOUS; SUBCUTANEOUS at 11:21

## 2018-08-27 RX ADMIN — DOCUSATE SODIUM 100 MG: 100 CAPSULE, LIQUID FILLED ORAL at 21:12

## 2018-08-27 RX ADMIN — HYDROMORPHONE HYDROCHLORIDE 0.5 MG: 1 INJECTION, SOLUTION INTRAMUSCULAR; INTRAVENOUS; SUBCUTANEOUS at 11:02

## 2018-08-27 RX ADMIN — OXYCODONE HYDROCHLORIDE 5 MG: 5 TABLET ORAL at 16:42

## 2018-08-27 RX ADMIN — SODIUM CHLORIDE 1000 ML: 9 INJECTION, SOLUTION INTRAVENOUS at 10:20

## 2018-08-27 RX ADMIN — FENTANYL CITRATE 100 MCG: 50 INJECTION INTRAMUSCULAR; INTRAVENOUS at 07:41

## 2018-08-27 RX ADMIN — HYDROMORPHONE HYDROCHLORIDE 0.5 MG: 1 INJECTION, SOLUTION INTRAMUSCULAR; INTRAVENOUS; SUBCUTANEOUS at 10:52

## 2018-08-27 RX ADMIN — TRANEXAMIC ACID 1000 MG: 100 INJECTION, SOLUTION INTRAVENOUS at 07:43

## 2018-08-27 RX ADMIN — PROCHLORPERAZINE EDISYLATE 10 MG: 5 INJECTION INTRAMUSCULAR; INTRAVENOUS at 21:12

## 2018-08-27 RX ADMIN — ACETAMINOPHEN 650 MG: 325 TABLET ORAL at 21:12

## 2018-08-27 RX ADMIN — ROCURONIUM BROMIDE 50 MG: 10 INJECTION, SOLUTION INTRAVENOUS at 07:41

## 2018-08-27 RX ADMIN — HYDROMORPHONE HYDROCHLORIDE 1 MG: 2 INJECTION, SOLUTION INTRAMUSCULAR; INTRAVENOUS; SUBCUTANEOUS at 08:09

## 2018-08-27 RX ADMIN — ONDANSETRON 4 MG: 2 INJECTION INTRAMUSCULAR; INTRAVENOUS at 09:48

## 2018-08-27 RX ADMIN — LIDOCAINE HYDROCHLORIDE 100 MG: 20 INJECTION, SOLUTION EPIDURAL; INFILTRATION; INTRACAUDAL; PERINEURAL at 07:41

## 2018-08-27 RX ADMIN — FENTANYL CITRATE 50 MCG: 50 INJECTION INTRAMUSCULAR; INTRAVENOUS at 11:40

## 2018-08-27 RX ADMIN — OXYCODONE HYDROCHLORIDE 10 MG: 5 TABLET ORAL at 21:12

## 2018-08-27 ASSESSMENT — PULMONARY FUNCTION TESTS
PIF_VALUE: 1
PIF_VALUE: 19
PIF_VALUE: 19
PIF_VALUE: 18
PIF_VALUE: 21
PIF_VALUE: 18
PIF_VALUE: 5
PIF_VALUE: 19
PIF_VALUE: 19
PIF_VALUE: 16
PIF_VALUE: 19
PIF_VALUE: 19
PIF_VALUE: 18
PIF_VALUE: 19
PIF_VALUE: 18
PIF_VALUE: 18
PIF_VALUE: 21
PIF_VALUE: 19
PIF_VALUE: 19
PIF_VALUE: 16
PIF_VALUE: 19
PIF_VALUE: 1
PIF_VALUE: 1
PIF_VALUE: 5
PIF_VALUE: 21
PIF_VALUE: 19
PIF_VALUE: 18
PIF_VALUE: 20
PIF_VALUE: 19
PIF_VALUE: 17
PIF_VALUE: 17
PIF_VALUE: 20
PIF_VALUE: 18
PIF_VALUE: 16
PIF_VALUE: 19
PIF_VALUE: 18
PIF_VALUE: 21
PIF_VALUE: 19
PIF_VALUE: 19
PIF_VALUE: 18
PIF_VALUE: 20
PIF_VALUE: 19
PIF_VALUE: 2
PIF_VALUE: 17
PIF_VALUE: 18
PIF_VALUE: 16
PIF_VALUE: 19
PIF_VALUE: 19
PIF_VALUE: 16
PIF_VALUE: 18
PIF_VALUE: 3
PIF_VALUE: 21
PIF_VALUE: 2
PIF_VALUE: 19
PIF_VALUE: 19
PIF_VALUE: 4
PIF_VALUE: 21
PIF_VALUE: 2
PIF_VALUE: 19
PIF_VALUE: 20
PIF_VALUE: 16
PIF_VALUE: 18
PIF_VALUE: 17
PIF_VALUE: 19
PIF_VALUE: 18
PIF_VALUE: 18
PIF_VALUE: 17
PIF_VALUE: 19
PIF_VALUE: 20
PIF_VALUE: 18
PIF_VALUE: 18
PIF_VALUE: 17
PIF_VALUE: 19
PIF_VALUE: 21
PIF_VALUE: 19
PIF_VALUE: 18
PIF_VALUE: 18
PIF_VALUE: 19
PIF_VALUE: 18
PIF_VALUE: 3
PIF_VALUE: 20
PIF_VALUE: 19
PIF_VALUE: 18
PIF_VALUE: 18
PIF_VALUE: 19
PIF_VALUE: 20
PIF_VALUE: 19
PIF_VALUE: 19
PIF_VALUE: 17
PIF_VALUE: 19
PIF_VALUE: 1
PIF_VALUE: 19
PIF_VALUE: 2
PIF_VALUE: 16
PIF_VALUE: 18
PIF_VALUE: 1
PIF_VALUE: 5
PIF_VALUE: 16
PIF_VALUE: 18
PIF_VALUE: 18
PIF_VALUE: 19
PIF_VALUE: 19
PIF_VALUE: 20
PIF_VALUE: 19
PIF_VALUE: 1
PIF_VALUE: 19
PIF_VALUE: 20
PIF_VALUE: 18
PIF_VALUE: 17
PIF_VALUE: 19
PIF_VALUE: 18
PIF_VALUE: 19
PIF_VALUE: 19
PIF_VALUE: 0
PIF_VALUE: 18
PIF_VALUE: 19
PIF_VALUE: 18
PIF_VALUE: 18
PIF_VALUE: 19
PIF_VALUE: 20
PIF_VALUE: 20
PIF_VALUE: 18
PIF_VALUE: 22
PIF_VALUE: 19
PIF_VALUE: 19
PIF_VALUE: 17
PIF_VALUE: 17
PIF_VALUE: 16
PIF_VALUE: 20
PIF_VALUE: 19
PIF_VALUE: 16
PIF_VALUE: 18
PIF_VALUE: 16
PIF_VALUE: 7
PIF_VALUE: 16
PIF_VALUE: 20

## 2018-08-27 ASSESSMENT — PAIN SCALES - GENERAL
PAINLEVEL_OUTOF10: 8
PAINLEVEL_OUTOF10: 5
PAINLEVEL_OUTOF10: 8
PAINLEVEL_OUTOF10: 8
PAINLEVEL_OUTOF10: 0
PAINLEVEL_OUTOF10: 8
PAINLEVEL_OUTOF10: 0
PAINLEVEL_OUTOF10: 7
PAINLEVEL_OUTOF10: 8
PAINLEVEL_OUTOF10: 8
PAINLEVEL_OUTOF10: 0
PAINLEVEL_OUTOF10: 0
PAINLEVEL_OUTOF10: 8
PAINLEVEL_OUTOF10: 8
PAINLEVEL_OUTOF10: 7
PAINLEVEL_OUTOF10: 0
PAINLEVEL_OUTOF10: 8
PAINLEVEL_OUTOF10: 6
PAINLEVEL_OUTOF10: 5
PAINLEVEL_OUTOF10: 8

## 2018-08-27 ASSESSMENT — PAIN DESCRIPTION - ONSET
ONSET: GRADUAL
ONSET: ON-GOING

## 2018-08-27 ASSESSMENT — PAIN DESCRIPTION - ORIENTATION
ORIENTATION: LEFT

## 2018-08-27 ASSESSMENT — PAIN DESCRIPTION - PAIN TYPE
TYPE: SURGICAL PAIN

## 2018-08-27 ASSESSMENT — PAIN DESCRIPTION - LOCATION
LOCATION: KNEE

## 2018-08-27 ASSESSMENT — PAIN DESCRIPTION - PROGRESSION
CLINICAL_PROGRESSION: NOT CHANGED
CLINICAL_PROGRESSION: GRADUALLY WORSENING

## 2018-08-27 ASSESSMENT — PAIN DESCRIPTION - DESCRIPTORS
DESCRIPTORS: ACHING

## 2018-08-27 ASSESSMENT — PAIN DESCRIPTION - FREQUENCY
FREQUENCY: CONTINUOUS

## 2018-08-27 ASSESSMENT — PAIN - FUNCTIONAL ASSESSMENT: PAIN_FUNCTIONAL_ASSESSMENT: 0-10

## 2018-08-27 NOTE — OP NOTE
cured.  The tourniquet was  deflated. Hemostasis was excellent. Final gap stability checked, and then  12 mm placed into the knee with the permanent polyethylene, and again gap  stability was excellent with full knee in flexion, extension. The knee then had a double irrigation again including chlorhexidine  followed by closure of the knee. First the extensor mechanism balancing  the patellofemoral joint to allow normal medial lateral glide followed by  dead space sutures prepatellar in location, followed by the plastic skin  closure with interrupted 3-0 and 4-0 Monocryl. The patient was placed in  Ace cotton compression dressing. The Orthomix was placed deep IM medially  and laterally. Tranexamic acid was utilized IV dosage x2 1000 mg. There  are no complications. Patient returned to recovery unit in Ace cotton  compression dressing. ESTIMATED BLOOD LOSS:  75 ml.         Darryn Guerrero MD    D: 08/27/2018 9:50:40       T: 08/27/2018 9:53:01     [email or web address]  Job#: [id number]     Doc#: [id number]    CC:

## 2018-08-27 NOTE — ANESTHESIA POSTPROCEDURE EVALUATION
Department of Anesthesiology  Postprocedure Note    Patient: Caitlin Jackman  MRN: [id number]  YOB: 1955  Date of evaluation: 8/27/2018  Time:  10:32 AM     Procedure Summary     Date:  08/27/18 Room / Location:  [facility] OR 10 / [facility] OR    Anesthesia Start:  6560 Anesthesia Stop:  6968    Procedure:  LEFT TOTAL KNEE ARTHROPLASTY (Left Knee) Diagnosis:  (OSTEOARTHRITIS LEFT KNEE)    Surgeon:  Dwight Baig MD Responsible Provider:  Rowena Conrad MD    Anesthesia Type:  general ASA Status:  2          Anesthesia Type: general    Anshu Phase I: Anshu Score: 7    Anshu Phase II:      Last vitals: Reviewed and per EMR flowsheets.        Anesthesia Post Evaluation    Patient location during evaluation: PACU  Patient participation: complete - patient cannot participate  Level of consciousness: awake  Airway patency: patent  Nausea & Vomiting: no nausea  Complications: no  Cardiovascular status: hemodynamically stable  Respiratory status: acceptable  Hydration status: euvolemic

## 2018-08-27 NOTE — BRIEF OP NOTE
Brief Postoperative Note  ______________________________________________________________    Patient: Navid Barnes  YOB: 1955  MRN: [id number]  Date of Procedure: 8/27/2018    Pre-Op Diagnosis: OSTEOARTHRITIS LEFT KNEE    Post-Op Diagnosis: Same       Procedure(s):  LEFT TOTAL KNEE ARTHROPLASTY    Anesthesia: General    Surgeon(s):  Faizan Duvall MD   Ravalli Bradley,  - Fellow Assisting    Staff:  Surgical Assistant: Lorrie Meeks  Scrub Person First: Gissel Vargas     Estimated Blood Loss: 993 mL    Complications: None    Specimens:   * No specimens in log *    Implants:  * No implants in log *      Drains:  none    Findings: Severe Osteoarthritis     Farooq Lorenz IV, DO  Date: 8/27/2018  Time: 9:37 AM

## 2018-08-27 NOTE — PROGRESS NOTES
PACU Transfer Note    Vitals:    08/27/18 1215   BP: 110/61   Pulse: 70   Resp: 14   Temp: 97.2 °F (36.2 °C)   SpO2: 98%       In: 482 [P.O.:50; I.V.:432]  Out: 250     Pain assessment:  Pt states pain is tolerable. Ice therapy in place and extremity is elevated. Pain Level: 8    Report given to Receiving unit QUENTIN Cruz RN at bedside- specifically focusing on pain level and SP02. .    8/27/2018 12:18 PM

## 2018-08-27 NOTE — PROGRESS NOTES
Physical Therapy/Occupational Therapy    No treatment/hold note    Referral received and chart reviewed. Per RN, pt is diaphoretic and hospitalist has ordered fluid bolus for patient. RN requests no PT and OT this pm. Will f/u 8/28 am per pathway.         Ayesha Brownlee, PT  Enrrique Hwang, OT 2714

## 2018-08-27 NOTE — PROGRESS NOTES
From OR to pacu bay 4 accompanied by Ajay Ramirez crna and monitors applied. No intraop issues reported and pt received orthomix to surgical site. Pt is wearing mesh panties on arrival to pacu and operative extremity elevated. Pt denies pain on arrival and arrives with oral airway in place- she follows commands and shakes head yes or no to questions. Oral airway removed at 1027 airway patent.

## 2018-08-27 NOTE — PROGRESS NOTES
Patient remains no longer diaphoretic and still easily arouses. Respirations 10-12. Dr. Leandro Hartmann was at bedside to see patient. IV bolus started. Will continue to monitor. Ace wrap CD&I. Pedal push/pull moderate on left foot. Fall precautions in place. Bed alarm is on.

## 2018-08-27 NOTE — ANESTHESIA PRE PROCEDURE
Date    Arthritis     Dental crown present     molar       Past Surgical History:        Procedure Laterality Date    COLONOSCOPY      HYSTERECTOMY, VAGINAL      KIDNEY SURGERY Left     TONSILLECTOMY AND ADENOIDECTOMY         Social History:    Social History   Substance Use Topics    Smoking status: Never Smoker    Smokeless tobacco: Never Used    Alcohol use No                                Counseling given: Not Answered      Vital Signs (Current):   Vitals:    08/27/18 0557   BP: 126/85   Pulse: 85   Resp: 18   Temp: 98.5 °F (36.9 °C)   TempSrc: Oral   SpO2: 97%   Weight: 200 lb (90.7 kg)   Height: 5' 10\" (1.778 m)                                              BP Readings from Last 3 Encounters:   08/27/18 126/85   08/22/18 134/83   08/23/18 (!) 118/46       NPO Status: Time of last liquid consumption: 2100                        Time of last solid consumption: 1900                        Date of last liquid consumption: 08/26/18                        Date of last solid food consumption: 08/26/18    BMI:   Wt Readings from Last 3 Encounters:   08/27/18 200 lb (90.7 kg)   08/22/18 200 lb (90.7 kg)   08/23/18 200 lb (90.7 kg)     Body mass index is 28.7 kg/m². CBC:   Lab Results   Component Value Date    WBC 6.0 08/27/2018    RBC 5.01 08/27/2018    HGB 13.9 08/27/2018    HCT 41.1 08/27/2018    MCV 82.1 08/27/2018    RDW 13.2 08/27/2018     08/27/2018       CMP:   Lab Results   Component Value Date     08/22/2018    K 4.5 08/22/2018     08/22/2018    CO2 27 08/22/2018    BUN 13 08/22/2018    CREATININE 0.7 08/22/2018    GFRAA >60 08/22/2018    LABGLOM >60 08/22/2018    GLUCOSE 82 08/22/2018    PROT 6.9 08/22/2018    CALCIUM 9.8 08/22/2018    BILITOT 0.3 08/22/2018    ALKPHOS 158 08/22/2018    AST 19 08/22/2018    ALT 23 08/22/2018       POC Tests: No results for input(s): POCGLU, POCNA, POCK, POCCL, POCBUN, POCHEMO, POCHCT in the last 72 hours.     Coags:   Lab Results   Component Value Date    PROTIME 10.2 08/22/2018    INR 0.89 08/22/2018    APTT 32.0 08/22/2018       HCG (If Applicable): No results found for: PREGTESTUR, PREGSERUM, HCG, HCGQUANT     ABGs: No results found for: PHART, PO2ART, HWF8WWS, EHC3IYJ, BEART, H6TRQSLU     Type & Screen (If Applicable):  No results found for: LABABO, [address]    Anesthesia Evaluation  Patient summary reviewed no history of anesthetic complications:   Airway: Mallampati: II  TM distance: >3 FB   Neck ROM: full  Mouth opening: > = 3 FB Dental:          Pulmonary:Negative Pulmonary ROS                              Cardiovascular:Negative CV ROS                      Neuro/Psych:   Negative Neuro/Psych ROS              GI/Hepatic/Renal: Neg GI/Hepatic/Renal ROS            Endo/Other:                      ROS comment: Hx ARTHRITIS Abdominal:           Vascular:                                        Anesthesia Plan      general     ASA 2     (Fem n block only if necessary)  Induction: intravenous. Anesthetic plan and risks discussed with patient.                       Maykel Pantoja MD   8/27/2018

## 2018-08-27 NOTE — PROGRESS NOTES
Secure message sent to Dr. Manda Atkins that patient's respirations 8 when asleep but easily arousable. Once awake patient stated she was light headed. Patient was diaphoretic. Blood glucose 151. When awake respirations 10-12. Cool wash cloth placed on forehead and now no longer diaphoretic.

## 2018-08-28 VITALS
TEMPERATURE: 98.2 F | WEIGHT: 200 LBS | BODY MASS INDEX: 28.63 KG/M2 | HEIGHT: 70 IN | SYSTOLIC BLOOD PRESSURE: 123 MMHG | HEART RATE: 85 BPM | DIASTOLIC BLOOD PRESSURE: 80 MMHG | OXYGEN SATURATION: 96 % | RESPIRATION RATE: 16 BRPM

## 2018-08-28 LAB
GLUCOSE BLD-MCNC: 121 MG/DL (ref 70–99)
HCT VFR BLD CALC: 33 % (ref 36–48)
HEMOGLOBIN: 11.1 G/DL (ref 12–16)
PERFORMED ON: ABNORMAL

## 2018-08-28 PROCEDURE — G8978 MOBILITY CURRENT STATUS: HCPCS

## 2018-08-28 PROCEDURE — 6360000002 HC RX W HCPCS: Performed by: INTERNAL MEDICINE

## 2018-08-28 PROCEDURE — 97535 SELF CARE MNGMENT TRAINING: CPT

## 2018-08-28 PROCEDURE — 97165 OT EVAL LOW COMPLEX 30 MIN: CPT

## 2018-08-28 PROCEDURE — G0378 HOSPITAL OBSERVATION PER HR: HCPCS

## 2018-08-28 PROCEDURE — 94664 DEMO&/EVAL PT USE INHALER: CPT

## 2018-08-28 PROCEDURE — 97530 THERAPEUTIC ACTIVITIES: CPT

## 2018-08-28 PROCEDURE — 36415 COLL VENOUS BLD VENIPUNCTURE: CPT

## 2018-08-28 PROCEDURE — 85014 HEMATOCRIT: CPT

## 2018-08-28 PROCEDURE — 94761 N-INVAS EAR/PLS OXIMETRY MLT: CPT

## 2018-08-28 PROCEDURE — 6370000000 HC RX 637 (ALT 250 FOR IP): Performed by: ORTHOPAEDIC SURGERY

## 2018-08-28 PROCEDURE — 97161 PT EVAL LOW COMPLEX 20 MIN: CPT

## 2018-08-28 PROCEDURE — 6370000000 HC RX 637 (ALT 250 FOR IP): Performed by: INTERNAL MEDICINE

## 2018-08-28 PROCEDURE — G8989 SELF CARE D/C STATUS: HCPCS

## 2018-08-28 PROCEDURE — 85018 HEMOGLOBIN: CPT

## 2018-08-28 PROCEDURE — 2580000003 HC RX 258: Performed by: ORTHOPAEDIC SURGERY

## 2018-08-28 PROCEDURE — G8979 MOBILITY GOAL STATUS: HCPCS

## 2018-08-28 PROCEDURE — G8988 SELF CARE GOAL STATUS: HCPCS

## 2018-08-28 PROCEDURE — 97116 GAIT TRAINING THERAPY: CPT

## 2018-08-28 PROCEDURE — 97110 THERAPEUTIC EXERCISES: CPT

## 2018-08-28 PROCEDURE — 6360000002 HC RX W HCPCS: Performed by: ORTHOPAEDIC SURGERY

## 2018-08-28 PROCEDURE — G8987 SELF CARE CURRENT STATUS: HCPCS

## 2018-08-28 RX ORDER — HYDROCODONE BITARTRATE AND ACETAMINOPHEN 5; 325 MG/1; MG/1
2 TABLET ORAL EVERY 4 HOURS PRN
Status: DISCONTINUED | OUTPATIENT
Start: 2018-08-28 | End: 2018-08-28 | Stop reason: HOSPADM

## 2018-08-28 RX ORDER — ONDANSETRON 4 MG/1
4 TABLET, ORALLY DISINTEGRATING ORAL EVERY 8 HOURS PRN
Qty: 20 TABLET | Refills: 0 | Status: SHIPPED | OUTPATIENT
Start: 2018-08-28 | End: 2018-11-27 | Stop reason: ALTCHOICE

## 2018-08-28 RX ORDER — ACETAMINOPHEN, ASPIRIN AND CAFFEINE 250; 250; 65 MG/1; MG/1; MG/1
1 TABLET, FILM COATED ORAL ONCE
Status: COMPLETED | OUTPATIENT
Start: 2018-08-28 | End: 2018-08-28

## 2018-08-28 RX ORDER — ONDANSETRON 4 MG/1
4 TABLET, ORALLY DISINTEGRATING ORAL EVERY 8 HOURS PRN
Qty: 20 TABLET | Refills: 0 | Status: SHIPPED | OUTPATIENT
Start: 2018-08-28 | End: 2018-08-28

## 2018-08-28 RX ORDER — AMOXICILLIN 250 MG
2 CAPSULE ORAL DAILY PRN
Qty: 30 TABLET | Refills: 0 | COMMUNITY
Start: 2018-08-28 | End: 2018-11-27 | Stop reason: ALTCHOICE

## 2018-08-28 RX ORDER — ONDANSETRON 4 MG/1
4 TABLET, ORALLY DISINTEGRATING ORAL EVERY 8 HOURS PRN
Status: DISCONTINUED | OUTPATIENT
Start: 2018-08-28 | End: 2018-08-28 | Stop reason: HOSPADM

## 2018-08-28 RX ORDER — ASPIRIN 81 MG/1
81 TABLET ORAL 2 TIMES DAILY
Qty: 28 TABLET | Refills: 0 | COMMUNITY
Start: 2018-08-28 | End: 2018-11-27 | Stop reason: ALTCHOICE

## 2018-08-28 RX ORDER — HYDROCODONE BITARTRATE AND ACETAMINOPHEN 5; 325 MG/1; MG/1
1 TABLET ORAL EVERY 4 HOURS PRN
Status: DISCONTINUED | OUTPATIENT
Start: 2018-08-28 | End: 2018-08-28 | Stop reason: HOSPADM

## 2018-08-28 RX ADMIN — ASPIRIN 325 MG: 325 TABLET, DELAYED RELEASE ORAL at 10:00

## 2018-08-28 RX ADMIN — HYDROCODONE BITARTRATE AND ACETAMINOPHEN 1 TABLET: 5; 325 TABLET ORAL at 15:37

## 2018-08-28 RX ADMIN — OXYCODONE HYDROCHLORIDE 10 MG: 5 TABLET ORAL at 01:40

## 2018-08-28 RX ADMIN — CETIRIZINE HYDROCHLORIDE 10 MG: 10 TABLET, FILM COATED ORAL at 10:01

## 2018-08-28 RX ADMIN — DOCUSATE SODIUM 100 MG: 100 CAPSULE, LIQUID FILLED ORAL at 10:00

## 2018-08-28 RX ADMIN — CEFAZOLIN SODIUM 2 G: 10 INJECTION, POWDER, FOR SOLUTION INTRAVENOUS at 01:00

## 2018-08-28 RX ADMIN — OXYCODONE HYDROCHLORIDE 10 MG: 5 TABLET ORAL at 10:01

## 2018-08-28 RX ADMIN — HYDROCODONE BITARTRATE AND ACETAMINOPHEN 1 TABLET: 5; 325 TABLET ORAL at 14:31

## 2018-08-28 RX ADMIN — OXYCODONE HYDROCHLORIDE 10 MG: 5 TABLET ORAL at 05:48

## 2018-08-28 RX ADMIN — ONDANSETRON 4 MG: 4 TABLET, ORALLY DISINTEGRATING ORAL at 11:55

## 2018-08-28 RX ADMIN — ACETAMINOPHEN, ASPIRIN AND CAFFEINE 1 TABLET: 250; 250; 65 TABLET, FILM COATED ORAL at 15:37

## 2018-08-28 RX ADMIN — DEXAMETHASONE SODIUM PHOSPHATE 6 MG: 4 INJECTION, SOLUTION INTRAMUSCULAR; INTRAVENOUS at 15:37

## 2018-08-28 RX ADMIN — DEXAMETHASONE SODIUM PHOSPHATE 10 MG: 4 INJECTION, SOLUTION INTRA-ARTICULAR; INTRALESIONAL; INTRAMUSCULAR; INTRAVENOUS; SOFT TISSUE at 02:14

## 2018-08-28 RX ADMIN — HYDROMORPHONE HYDROCHLORIDE 0.5 MG: 1 INJECTION, SOLUTION INTRAMUSCULAR; INTRAVENOUS; SUBCUTANEOUS at 07:55

## 2018-08-28 ASSESSMENT — PAIN SCALES - GENERAL
PAINLEVEL_OUTOF10: 7
PAINLEVEL_OUTOF10: 6
PAINLEVEL_OUTOF10: 5
PAINLEVEL_OUTOF10: 6
PAINLEVEL_OUTOF10: 8
PAINLEVEL_OUTOF10: 6
PAINLEVEL_OUTOF10: 3
PAINLEVEL_OUTOF10: 5
PAINLEVEL_OUTOF10: 7
PAINLEVEL_OUTOF10: 7

## 2018-08-28 ASSESSMENT — PAIN DESCRIPTION - ORIENTATION
ORIENTATION: LEFT
ORIENTATION: LEFT

## 2018-08-28 ASSESSMENT — PAIN DESCRIPTION - FREQUENCY
FREQUENCY: CONTINUOUS
FREQUENCY: CONTINUOUS

## 2018-08-28 ASSESSMENT — PAIN DESCRIPTION - PROGRESSION
CLINICAL_PROGRESSION: NOT CHANGED
CLINICAL_PROGRESSION: NOT CHANGED

## 2018-08-28 ASSESSMENT — PAIN DESCRIPTION - ONSET
ONSET: ON-GOING
ONSET: ON-GOING

## 2018-08-28 ASSESSMENT — PAIN DESCRIPTION - DESCRIPTORS
DESCRIPTORS: ACHING;DULL
DESCRIPTORS: ACHING;DULL

## 2018-08-28 ASSESSMENT — PAIN DESCRIPTION - PAIN TYPE
TYPE: SURGICAL PAIN
TYPE: SURGICAL PAIN

## 2018-08-28 ASSESSMENT — PAIN DESCRIPTION - LOCATION
LOCATION: KNEE
LOCATION: KNEE

## 2018-08-28 NOTE — PLAN OF CARE
Problem: Falls - Risk of:  Goal: Will remain free from falls  Will remain free from falls   Outcome: Met This Shift  Remains free from injury.  Up with walker with stand-by assist.

## 2018-08-28 NOTE — PROGRESS NOTES
PTA)  ADL Assistance: Independent  Homemaking Assistance: Independent ( can perform post op)  Ambulation Assistance: Independent  Transfer Assistance: Independent  Active : Yes  Occupation: Retired  Additional Comments: Pt staying at [facility] locally until Thursday for [address] room is handicap accessible with DME in bathroom       Objective   Vision: Within Functional Limits  Hearing: Within functional limits    Orientation  Overall Orientation Status: Within Normal Limits     Balance  Sitting Balance: Independent  Standing Balance: Supervision (static stance at [employer])  Standing Balance  Time: 5 min  Activity: grooming, bathroom mobility    Functional Mobility  Functional - Mobility Device: Rolling Walker  Activity: To/from bathroom  Assist Level: Stand by assistance  Functional Mobility Comments: pt steady and no LOB observed    ADL  Feeding: Independent  Grooming: Independent (oral care, wash face, hand hygiene (supv stance at sink))  UE Bathing: Supervision (seated sponge bath)  LE Bathing:  (declined)  UE Dressing: Independent  LE Dressing: Stand by assistance (don brief- sba when pulling up over hips instance; increased time to thread L LE due to decreased ROM/pain)  Toileting: Unable to assess(comment) (pt completed just prior to therapy entry)   Educated on recommendation for  initial supv/assist for safe shower transfer. Pt has shower chair for increased safety        Transfers  Sit to stand: Stand by assistance (shower chair, recliner)  Stand to sit: Stand by assistance (shower chair, recliner)  Transfer Comments: demo safe hand placement during transfers using walker     Cognition  Overall Cognitive Status: WNL                 LUE AROM (degrees)  LUE AROM : WNL  RUE AROM (degrees)  RUE AROM : WNL  LUE Strength  Gross LUE Strength: WFL  RUE Strength  Gross RUE Strength: WFL      Treatment consisted of:  ADLs, transfer training, education on activity promotion and fall precautions.

## 2018-08-28 NOTE — PLAN OF CARE
Problem: Falls - Risk of:  Goal: Absence of physical injury  Absence of physical injury   Outcome: Met This Shift  Pt free from injury this shift and free of falls. 2/4 rails up on bed and bed is in the lowest position. Wheels locked and bed alarm set. Socks on pt and ID bands on pt. Call light in reach of pt and pt educated to call out to get up x1 walker sba. Will continue to monitor for safety.

## 2018-08-28 NOTE — PROGRESS NOTES
bilaterally without Rales/Wheezes/Rhonchi. Cardiovascular: Regular rate and rhythm with normal S1/S2 without murmurs, rubs or gallops. Abdomen: Soft, non-tender, non-distended with normal bowel sounds. Musculoskeletal: No clubbing, cyanosis or edema bilaterally. Full range of motion without deformity. Skin: Skin color, texture, turgor normal.  No rashes or lesions. Neurologic:  Neurovascularly intact without any focal sensory/motor deficits. Cranial nerves: II-XII intact, grossly non-focal.  Psychiatric: Alert and oriented, thought content appropriate, normal insight  Capillary Refill: Brisk,< 3 seconds   Peripheral Pulses: +2 palpable, equal bilaterally       Labs:   Recent Labs      08/27/18   0550  08/28/18   0548   WBC  6.0   --    HGB  13.9  11.1*   HCT  41.1  33.0*   PLT  209   --      Recent Labs      08/27/18   1521   NA  141   K  4.8   CL  106   CO2  25   BUN  15   CREATININE  0.6   CALCIUM  8.7     Recent Labs      08/27/18   1521   AST  17   ALT  18   BILITOT  0.3   ALKPHOS  130*     No results for input(s): INR in the last 72 hours. No results for input(s): Esther Sneddon in the last 72 hours.     Urinalysis:    No results found for: Zeke Corrales, BACTERIA, [address] [place] [address]    Radiology:  No orders to display       Assessment/Plan:    Active Hospital Problems    Diagnosis Date Noted    Osteoarthritis of left knee [M17.12] 08/27/2018    Postoperative state [Z98.890] 08/27/2018   - Post operative care  - Lightheadedness, vertigo  - Post operative Bradypnea   S/p IVF symptoms improved  Off IVF now    Post surgical care:  - POC glucose 4x daily with meals  - Hypoglycemia protocol  - Incentive spirometry q hour while awake  - Oral Zofran helped with nausea management  - Worked with PT/OT      Nausea and vomiting:  - Likely medication induced, with Roxicodone use  - Given norco for pain later which did not cause nausea/vomtting  - Zofran ordered for prn use - Prescription for

## 2018-08-28 NOTE — PROGRESS NOTES
Bed/Bath upstairs (finished bathroom in basement)  Home Access: Stairs to enter without rails (3 (can use walker on))  Bathroom Shower/Tub: Walk-in shower  Bathroom Toilet: Handicap height (has RTS available if needed)  Bathroom Equipment: Shower chair, Hand-held shower  Home Equipment: Standard walker, Cane, [address] (transport chair; pt not using DME PTA)  ADL Assistance: Independent  Homemaking Assistance: Independent ( can perform post op)  Ambulation Assistance: Independent  Transfer Assistance: Independent  Active : Yes  Occupation: Retired  Additional Comments: Pt staying at [facility] locally until Thursday for [address] PT. Objective          AROM LLE (degrees)  LLE AROM :  (seated L knee 29-65 degrees)           Bed mobility  Supine to Sit: Supervision (HOB up to 30 degrees)  Scooting: Independent     Transfers  Sit to Stand: Stand by assistance (x3 trials with vc for hand placement)  Stand to sit: Stand by assistance (x3 trials with vc for hand placement)     Ambulation  Device: Rolling Walker  Assistance: Contact guard assistance (progressing to SBA)  Quality of Gait: slight forward posture with slow gait utilizing step to pattern leading with L LE; WBAT L  Distance: 10 ft x2, 60 ft  Stairs/Curb  Stairs?:  (up/down curb step x2 with rolling walker SBA; occ vc for safe sequence WBAT L)        Exercises  Comments: Pt demo independent TKR HEP x10 from [facility] booklet with vc/visual cues; pt also independent with OP PT HEP except could not tolerate 15 min extension stretch supine this am.      Assessment   Body structures, Functions, Activity limitations: Decreased functional mobility   Assessment: Pt demo good progress and she is planning to d/c to hotel today with 24 hour assist of spouse then return home Thursday. Pt has no new DME needs (std walker issued at PT and pt to discuss with OP PT for std vs wheeled) and pt plans to pursue OP PT starting Thursday.  If home, recommend 24 hour supervision initially, OP PT, use of rolling walker. Treatment Diagnosis: mobility impairment due to L TKR  Decision Making: Low Complexity  Patient Education: Pt educated on PT role, TKR pathway, home dispo, importance of OOB mobility, WBAT L and she verbalized understanding. REQUIRES PT FOLLOW UP: Yes  Activity Tolerance  Activity Tolerance: Patient Tolerated treatment well;Patient limited by fatigue         Plan   Plan  Times per week: 7  Times per day: Twice a day  Current Treatment Recommendations: Functional Mobility Training, Home Exercise Program, Stair training, Endurance Training  Safety Devices  Type of devices:  (pt left up in BR with OTR/L)    G-Code  PT G-Codes  Functional Limitation: Mobility: Walking and moving around  Mobility: Walking and Moving Around Current Status (): At least 20 percent but less than 40 percent impaired, limited or restricted  Mobility: Walking and Moving Around Goal Status (): At least 1 percent but less than 20 percent impaired, limited or restricted                                             AM-PAC Score  AM-PAC Inpatient Mobility Raw Score : 20  AM-PAC Inpatient T-Scale Score : 47.67  Mobility Inpatient CMS 0-100% Score: 35.83  Mobility Inpatient CMS G-Code Modifier : CJ          Goals  Short term goals  Time Frame for Short term goals: discharge  Short term goal 1: ambulate 100 ft with rolling walker supervision  Short term goal 2: up/down curb step with walker supervision  Patient Goals   Patient goals : return home       Therapy Time   Individual Concurrent Group Co-treatment   Time In 0826         Time Out 0925         Minutes 59           Timed Code Treatment Minutes: 49      Total Treatment Minutes:  59    This note will serve as a discharge summary if patient is discharged from hospital before next treatment session.          Wil Liz, PT

## 2018-08-28 NOTE — PROGRESS NOTES
[address]  Progress Note  Total Knee Arthroplasty     Date:  2018    Name:  Gutierrez Rivas  Address:  [address] [place] [address]    :  1955      Age:   66 y.o.    SSN:  [id number]      Medical Record Number:  [id number]    SUBJECTIVE:    Gutierrez Rivas is a 66y.o. year old female who is now POD # 1  s/p Left total knee arthroplasty. Pain was well controlled overnight. Doing well with ambulation. There are no new complaints this AM. Nausea and diaphoresis improved this AM.     OBJECTIVE:  Infusions:   lactated ringers 100 mL/hr at 18 0559    sodium chloride 1,000 mL (18 1020)    lactated ringers      dextrose         I/O:I/O last 3 completed shifts: In: 482 [P.O.:50; I.V.:432]  Out: 1150 [Urine:900; Blood:250]           Wt Readings from Last 1 Encounters:   18 200 lb (90.7 kg)        LABS:    Recent Labs      18   0550   WBC  6.0   HGB  13.9   HCT  41.1   MCV  82.1   PLT  209        Recent Labs      18   1521   NA  141   K  4.8   CL  106   CO2  25   BUN  15   CREATININE  0.6        Recent Labs      18   1521   AST  17   ALT  18   BILITOT  0.3   ALKPHOS  130*      No results for input(s): LIPASE, AMYLASE in the last 72 hours. Recent Labs      18   1521   PROT  6.1*      No results for input(s): CKTOTAL, CKMB, CKMBINDEX, TROPONINI in the last 72 hours. Exam:/67   Pulse 94   Temp 98.1 °F (36.7 °C) (Oral)   Resp 14   Ht 5' 10\" (1.778 m)   Wt 200 lb (90.7 kg)   SpO2 94%   BMI 28.70 kg/m²    General appearance: Alert, oriented, NAD, and cooperative   Lungs: Breathing is unlabored, no audible wheezes   Heart: Regular rate and rhythm   Abdomen: soft, nontender   Neuro: Sensation is intact throughout foot. Vascular: Toes are well perfused. Left knee exam: dressing is clean, dry, and intact. Compartments soft and NT. 5+     DF/PF strength.  2+ palp PT/DP

## 2018-08-28 NOTE — PLAN OF CARE
Problem: Pain - Acute:  Goal: Pain level will decrease  Pain level will decrease    Outcome: Met This Shift  Pain level decreased with pain medication, decreased from 6/10 to 4/10. Problem: Pain:  Goal: Pain level will decrease  Pain level will decrease    Outcome: Met This Shift  Pain level decreased with pain medication, decreased from 6/10 to 4/10.

## 2018-08-30 ENCOUNTER — HOSPITAL ENCOUNTER (OUTPATIENT)
Dept: PHYSICAL THERAPY | Age: 63
Setting detail: THERAPIES SERIES
Discharge: HOME | End: 2018-08-30
Payer: COMMERCIAL

## 2018-08-30 ENCOUNTER — APPOINTMENT (OUTPATIENT)
Dept: PHYSICAL THERAPY | Age: 63
End: 2018-08-30
Payer: COMMERCIAL

## 2018-08-30 DIAGNOSIS — M17.11 PRIMARY OSTEOARTHRITIS OF RIGHT KNEE: Primary | ICD-10-CM

## 2018-08-30 PROCEDURE — 97530 THERAPEUTIC ACTIVITIES: CPT

## 2018-08-30 PROCEDURE — G8979 MOBILITY GOAL STATUS: HCPCS

## 2018-08-30 PROCEDURE — 97016 VASOPNEUMATIC DEVICE THERAPY: CPT

## 2018-08-30 PROCEDURE — 97110 THERAPEUTIC EXERCISES: CPT

## 2018-08-30 PROCEDURE — 97140 MANUAL THERAPY 1/> REGIONS: CPT

## 2018-08-30 PROCEDURE — G8978 MOBILITY CURRENT STATUS: HCPCS

## 2018-08-30 RX ORDER — OXYCODONE HYDROCHLORIDE AND ACETAMINOPHEN 5; 325 MG/1; MG/1
1 TABLET ORAL EVERY 6 HOURS PRN
Qty: 28 TABLET | Refills: 0 | Status: SHIPPED | OUTPATIENT
Start: 2018-08-30 | End: 2018-09-06

## 2018-08-30 NOTE — PLAN OF CARE
date with handout provided and all questions answered. Patient was instructed to contact PT with any complications or questions about HEP moving forward. Patient verbally stated she/he understood. Updated 8/30/18    Therapeutic Exercise and NMR EXR  [x] (10192) Provided verbal/tactile cueing for activities related to strengthening, flexibility, endurance, ROM for improvements in LE, proximal hip, and core control with self care, mobility, lifting, ambulation.  [] (85062) Provided verbal/tactile cueing for activities related to improving balance, coordination, kinesthetic sense, posture, motor skill, proprioception  to assist with LE, proximal hip, and core control in self care, mobility, lifting, ambulation and eccentric single leg control.      NMR and Therapeutic Activities:    [x] (82255 or 64309) Provided verbal/tactile cueing for activities related to improving balance, coordination, kinesthetic sense, posture, motor skill, proprioception and motor activation to allow for proper function of core, proximal hip and LE with self care and ADLs  [] (26998) Gait Re-education- Provided training and instruction to the patient for proper LE, core and proximal hip recruitment and positioning and eccentric body weight control with ambulation re-education including up and down stairs     Home Exercise Program:    [x] (11542) Reviewed/Progressed HEP activities related to strengthening, flexibility, endurance, ROM of core, proximal hip and LE for functional self-care, mobility, lifting and ambulation/stair navigation   [] (53879)Reviewed/Progressed HEP activities related to improving balance, coordination, kinesthetic sense, posture, motor skill, proprioception of core, proximal hip and LE for self care, mobility, lifting, and ambulation/stair navigation      Manual Treatments:  PROM / STM / Oscillations-Mobs:  G-I, II, III, IV (PA's, Inf., Post.)  [] (11247) Provided manual therapy to mobilize LE, proximal hip and/or LS spine soft tissue/joints for the purpose of modulating pain, promoting relaxation,  increasing ROM, reducing/eliminating soft tissue swelling/inflammation/restriction, improving soft tissue extensibility and allowing for proper ROM for normal function with self care, mobility, lifting and ambulation. Modalities:  Vaso 15'    Charges:  Timed Code Treatment Minutes: 40'   Total Treatment Minutes: 76'       [] EVAL (LOW) 21162 (typically 20 minutes face-to-face)  [] EVAL (MOD) 82564 (typically 30 minutes face-to-face)  [] EVAL (HIGH) 90559 (typically 45 minutes face-to-face)  [] RE-EVAL   [x] ZZ(13446) x  1   [] IONTO  [] NMR (10447) x      [x] VASO  [x] Manual (65074) x  1    [] Other:  [x] TA x  1    [] Mech Traction (33168)  [] ES(attended) (36482)      [] ES (un) (85869):     GOALS:  Patient stated goal: Return to walking for exercise without pain    Therapist goals for Patient:   Short Term Goals: To be achieved in: 2 weeks  1. Independent in HEP and progression per patient tolerance, in order to prevent re-injury. 2. Patient will have a decrease in pain at rest after surgery to <5/10  to facilitate improvement in movement, function, and ADLs as indicated by Functional Deficits. Long Term Goals: To be achieved in: 12 weeks  1. Disability index score of 30% or less for the LEFS to assist with reaching prior level of function. 2. Patient will demonstrate increased AROM to 0-120 or greater to allow for proper joint functioning as indicated by patients Functional Deficits. 3. Patient will demonstrate an increase in Strength to good proximal hip strength and control in LE to allow for proper functional mobility as indicated by patients Functional Deficits. 4. Patient will return to walking >30 minutes without increased symptoms or restriction. (patient specific functional goal)   5.  Patient will perform SL balance > 20 seconds bilaterally and TUG test at pre-op time or better to demonstrate return of

## 2018-09-05 ENCOUNTER — TELEPHONE (OUTPATIENT)
Dept: ORTHOPEDIC SURGERY | Age: 63
End: 2018-09-05

## 2018-09-05 ENCOUNTER — HOSPITAL ENCOUNTER (OUTPATIENT)
Dept: PHYSICAL THERAPY | Age: 63
Setting detail: THERAPIES SERIES
Discharge: HOME | End: 2018-09-05
Payer: COMMERCIAL

## 2018-09-05 DIAGNOSIS — M79.89 SWELLING OF LIMB: Primary | ICD-10-CM

## 2018-09-05 PROCEDURE — G0283 ELEC STIM OTHER THAN WOUND: HCPCS

## 2018-09-05 PROCEDURE — 97016 VASOPNEUMATIC DEVICE THERAPY: CPT

## 2018-09-05 PROCEDURE — 97530 THERAPEUTIC ACTIVITIES: CPT

## 2018-09-05 PROCEDURE — 97112 NEUROMUSCULAR REEDUCATION: CPT

## 2018-09-05 PROCEDURE — 97110 THERAPEUTIC EXERCISES: CPT

## 2018-09-05 NOTE — FLOWSHEET NOTE
limited by fatique  [] Patient limited by pain  [] Patient limited by other medical complications  [] Other:     Prognosis: [x] Good [] Fair  [] Poor    Patient Requires Follow-up: [x] Yes  [] No    PLAN: See eval  [] Continue per plan of care [] Alter current plan (see comments)  [x] Plan of care initiated [] Hold pending MD visit [] Discharge    Electronically signed by: Sydney Torres PT, DPT

## 2018-09-05 NOTE — TELEPHONE ENCOUNTER
Called to [facility] Coumadin 2.5 mg # 60 with 1 refill 3 tabs wed and thurs then 2 tablets after

## 2018-09-07 ENCOUNTER — HOSPITAL ENCOUNTER (OUTPATIENT)
Dept: PHYSICAL THERAPY | Age: 63
Setting detail: THERAPIES SERIES
Discharge: HOME | End: 2018-09-07
Payer: COMMERCIAL

## 2018-09-07 LAB
INR BLD: 1.2 (ref 0.8–1.2)
PROTHROMBIN TIME: 15.9 SECONDS (ref 11.7–14.2)

## 2018-09-07 PROCEDURE — 97530 THERAPEUTIC ACTIVITIES: CPT

## 2018-09-07 PROCEDURE — 97110 THERAPEUTIC EXERCISES: CPT

## 2018-09-07 PROCEDURE — 97140 MANUAL THERAPY 1/> REGIONS: CPT

## 2018-09-07 PROCEDURE — 97016 VASOPNEUMATIC DEVICE THERAPY: CPT

## 2018-09-07 PROCEDURE — 97112 NEUROMUSCULAR REEDUCATION: CPT

## 2018-09-07 NOTE — FLOWSHEET NOTE
[facility]     Physical Therapy Daily Treatment Note  Date:  2018    Patient Name:  Ian De La Torre    :  [birth year]  MRN: [id number]  Restrictions/Precautions:    Medical/Treatment Diagnosis Information:  · Diagnosis: M17.12 (ICD-10-CM) - Primary osteoarthritis of left knee  · Treatment Diagnosis: Decreased functional mobility ; Decreased ADL status; Decreased ROM; Decreased strength;Decreased endurance;Decreased balance;  · S/P Left TKR  · DOS: 18  · Medications: Percocet ever 5 hours, Baby ASA BID; Advil PRN  Insurance/Certification information:  PT Insurance Information: PT 2018 BENEFITS/ANTHEM/ ACTIVE/ DED 3450 .37/ OOP 6900 .66/ PAYS 100%/ 20 VPCY/ NO AUTH REQ/ RGI#[id number]/ CB 18   Physician Information:  Referring Practitioner: Kati Goodman of care signed (Y/N): Signed    Date of Patient follow up with Physician: 3, 6, 12 weeks PO  Patient seen in consultation with Dr. Rebecca Galindo who established the initial/subsequent treatment protocol. 18: surgery went well, minimal swelling, knee looks good      G-Code (if applicable):      Date G-Code Applied: 18        Progress Note: [x]  Yes  []  No  Next due by: Visit #12       Latex Allergy:  [x]NO      []YES  Preferred Language for Healthcare:   [x]English       []other:    Visit # Insurance Allowable   4 20 VPCY     Pain level:  6/10    SUBJECTIVE:  Patient presents to PT 1.5 weeks PO from Left TKR . Went to [facility] for [address] after PT Wednesday which came back positive for DVT. She is taking coumadin and plans for blood draw today. Reports she had a good day with PT yesterday with less pain and \"multiple workouts\". She does note that she has more tenderness around her medial malleolus today that makes it sore to walk on.          OUTCOME MEASURE DATE DEFICIT   LEFS 18 (pre-op) 79% Deficit   LEFS 18 PO 90% Deficit       OBJECTIVE:       18   ROM PROM AROM Overpressure Comment     L R L R L R     Flexion 77    128     ERMI   Extension 0    +4                                                8/30/18  Strength L R Comment   Quad 3/5 5/5 QS   Hamstring 4+ 4+     Gastroc         Hip  flexion 4+ 4+     Hip abd 5 5 seated                          9/5/18  Special Test Results/Comment   Homans Positive, ultrasound ordered   9/7/18: patient found to have positive US for DVT, currently on coumadin for treatment       9/7/18  Girth L R   Mid Patella 38.7 37.5   Suprapatellar 39.4 37.8   5cm above 40.6 40.2   15cm above             TEST INITIAL  8/23/18   GOAL   SINGLE LEG STANCE TIME L: >20\"     R: >20\"   >20 SECONDS   TIMED UP And GO 6.87\"   61-77 y/o: <9sec  66-76 y/o: <10sec  80-79 y/o: <12 sec         Reflexes/Sensation:               [x]Dermatomes/Myotomes intact               [x]Reflexes equal and normal bilaterally              []Other:     Joint mobility:                [x]Normal               []Hypo              []Hyper     Palpation: General PO tenderness     Functional Mobility/Transfers: independent     Posture:  WNL     Bandages/Dressings/Incisions:  Tegaderm bandage removed; steri-strips clean and in place; no signs of drainage or infection 9/5/18     Gait: (include devices/WB status) Partial WB on LLE with SW and shortened stride length 8/30/18     Orthopedic Special Tests: see above       RESTRICTIONS/PRECAUTIONS: Bilateral Knee OA    Exercises/Interventions:  Exercise/Equipment Resistance/Repetitions Other comments   Stretching       Hamstring 5 x 30\" Prop   Hip Flexor     ITB     Figure 4     Quad     Inclined Calf     Towel Pull 5 x 30\" Prop           ROM          Active x10 EXT/FLEX   Hang Weights 10' Prop with vaso   Sheet Pulls     Recumbent Bike     Biodex 15' Passive   ERMI 5 x 1' + 1'    Ankle Pumps x50 Black           Patellar Glides       Medial 3'     Superior 3'     Inferior 3'             Isometrics       Quad sets 2 x 10 x 10\" A/S     Add impaired gait mechanics.        Treatment/Activity Tolerance:  [x] Patient tolerated treatment well [] Patient limited by fatique  [] Patient limited by pain  [] Patient limited by other medical complications  [] Other:     Prognosis: [x] Good [] Fair  [] Poor    Patient Requires Follow-up: [x] Yes  [] No    PLAN: See eval  [] Continue per plan of care [] Alter current plan (see comments)  [x] Plan of care initiated [] Hold pending MD visit [] Discharge    Electronically signed by: Josh Gomze PT, DPT

## 2018-09-11 ENCOUNTER — HOSPITAL ENCOUNTER (OUTPATIENT)
Dept: PHYSICAL THERAPY | Age: 63
Setting detail: THERAPIES SERIES
Discharge: HOME | End: 2018-09-11
Payer: COMMERCIAL

## 2018-09-11 ENCOUNTER — OFFICE VISIT (OUTPATIENT)
Dept: ORTHOPEDIC SURGERY | Age: 63
End: 2018-09-11

## 2018-09-11 VITALS
HEART RATE: 82 BPM | SYSTOLIC BLOOD PRESSURE: 112 MMHG | BODY MASS INDEX: 27.02 KG/M2 | DIASTOLIC BLOOD PRESSURE: 69 MMHG | HEIGHT: 71 IN | WEIGHT: 193 LBS

## 2018-09-11 DIAGNOSIS — Z96.652 STATUS POST LEFT KNEE REPLACEMENT: ICD-10-CM

## 2018-09-11 DIAGNOSIS — M17.12 PRIMARY OSTEOARTHRITIS OF LEFT KNEE: Primary | ICD-10-CM

## 2018-09-11 LAB
INR BLD: 2 (ref 0.8–1.2)
PROTHROMBIN TIME: 23.3 SECONDS (ref 11.7–14.2)

## 2018-09-11 PROCEDURE — 97016 VASOPNEUMATIC DEVICE THERAPY: CPT

## 2018-09-11 PROCEDURE — 97110 THERAPEUTIC EXERCISES: CPT

## 2018-09-11 PROCEDURE — 97140 MANUAL THERAPY 1/> REGIONS: CPT

## 2018-09-11 PROCEDURE — 97530 THERAPEUTIC ACTIVITIES: CPT

## 2018-09-11 PROCEDURE — 97112 NEUROMUSCULAR REEDUCATION: CPT

## 2018-09-11 PROCEDURE — 99024 POSTOP FOLLOW-UP VISIT: CPT | Performed by: ORTHOPAEDIC SURGERY

## 2018-09-11 RX ORDER — METHYLPREDNISOLONE 4 MG/1
TABLET ORAL
Qty: 1 KIT | Refills: 0 | Status: SHIPPED | OUTPATIENT
Start: 2018-09-11 | End: 2018-09-17

## 2018-09-11 RX ORDER — OXYCODONE HYDROCHLORIDE AND ACETAMINOPHEN 5; 325 MG/1; MG/1
1 TABLET ORAL EVERY 6 HOURS PRN
Qty: 28 TABLET | Refills: 0 | Status: SHIPPED | OUTPATIENT
Start: 2018-09-11 | End: 2018-09-18

## 2018-09-11 NOTE — PROGRESS NOTES
EC tablet Take 1 tablet by mouth 2 times daily for 14 days 28 tablet 0    ondansetron (ZOFRAN-ODT) 4 MG disintegrating tablet Take 1 tablet by mouth every 8 hours as needed for Nausea or Vomiting 20 tablet 0    acetaminophen (TYLENOL) 500 MG tablet Take 1,000 mg by mouth every 6 hours as needed for Pain      Naproxen Sodium (ALEVE PO) Take by mouth      Cetirizine HCl (ZYRTEC ALLERGY PO) Take 10 mg by mouth daily       magnesium oxide (MAG-OX) 400 MG tablet Take 400 mg by mouth daily      Potassium 99 MG TABS Take 1 tablet by mouth daily        No current facility-administered medications for this visit. No Known Allergies    Review of Systems:  A 14 point review of systems was completed by the patient and is available in the media section of the scanned medical record and was reviewed on 9/11/2018. The review is negative with the exception of those things mentioned in the HPI and Past Medical History     Vital Signs:   /69   Pulse 82   Ht 5' 10.5\" (1.791 m)   Wt 193 lb (87.5 kg)   BMI 27.30 kg/m²     General Exam:   Mental Status: The patient is oriented to time, place and person. The patient's mood and affect are appropriate. Neurological: The patient has good coordination. There is no weakness or sensory deficit. Knee Examination: LEFT    Skin:  There are no skin lesions, cellulitis, or extreme edema in the lower extremities. Sensation is grossly intact to light touch bilaterally lower extremity. The patient has warm and well-perfused Bilateral lower extremities with brisk capillary refill. Inspection:  No effusion, mild echymosis in the ankle,  no gross deformities, no signs of infection. Palpation: There is no patellofemoral crepitus, there is no joint line tenderness    Range of Motion:  0-80 (90 with overpressure, guarding)    Strength:   Motor is grossly intact    Special Tests:   No ligament instability     Gait:  Non antalgic    Alignment: Neutral    Radiology: X-rays obtained and reviewed in office:   Impression shows no signs of infection, fracture, or loosening. XR KNEE LEFT (3 VIEWS)      Office Procedures:  Orders Placed This Encounter   Procedures    XR KNEE LEFT (3 VIEWS)     Order Specific Question:   Reason for exam:     Answer:   left knee 3 views            Assessment: A 66 y.o. female who presents 2 weeks status post left knee arthroplasty on 8/27/2018. Encounter Diagnoses   Name Primary?  Primary osteoarthritis of left knee Yes    Status post left knee replacement        Treatment Plan: The patient has been instructed on using warm compresses with her blood clot and a good at home program for increasing flexion. I will also put the patient on a medrol dose pack to help with her inflammation and hopefully help with increase ROM. This patient is in the early PO surgery phase and continues to do well WO major complications. The patient does have a blood clot in her peroneal artery and will have a follow up ultrasound in about a week two to be sure the clot has been resolved. The surgical site continues in the healing period WO signs of infection, drainage or erythema. The rehab protocol reviewed and continues PO and home program with ROM and strength exercises with progressive WB. Continue rehab protocol and FU. Diagnoses and treatments were reviewed with the patient today. Patient education material was provided. Questions were entertained and answered to the patient's satisfaction. Follow up in: PT    [unfilled]  2:19 PM      INoris ATC am scribing for and in the presence of Dr. Mello Rivas. The physical examination was performed by Dr. Mello Rivas. All counseling during the appointment was performed between the patient and Dr. Mello Rivas. 09/11/18 2:19 PM   Noris Nieves ATC    This dictation was performed with a verbal recognition program (DRAGON) and it was checked for errors.   It is possible that there are still

## 2018-09-11 NOTE — FLOWSHEET NOTE
[address] [facility]     [facility] Therapy Daily Treatment Note  Date:  2018    Patient Name:  Fay De Anda    :  [birth year]  MRN: [id number]  Restrictions/Precautions:    Medical/Treatment Diagnosis Information:  · Diagnosis: M17.12 (ICD-10-CM) - Primary osteoarthritis of left knee  · Treatment Diagnosis: Decreased functional mobility ; Decreased ADL status; Decreased ROM; Decreased strength;Decreased endurance;Decreased balance;  · S/P Left TKR  · DOS: 18  · Medications: Percocet ever 6 hours, Coumadin, Medrol Dose Pack (start )  Insurance/Certification information:  PT Insurance Information: PT 2018 BENEFITS/ANTHEM/ ACTIVE/ DED 3450 .37/ OOP 6900 .66/ PAYS 100%/ 20 VPCY/ NO AUTH REQ/ GBM#[id number]/ CB 18   Physician Information:  Referring Practitioner: Aliza Jamil of care signed (Y/N): Signed    Date of Patient follow up with Physician: 6, 12 weeks PO  Patient seen in consultation with Dr. Yadira Aguirre who established the initial/subsequent treatment protocol. 18: surgery went well, minimal swelling, knee looks good  18: saw MD upstairs + x-ray taken; Start medrol dose pack; keep pushing ROM, not concerned at this time       G-Code (if applicable):      Date G-Code Applied: 18        Progress Note: [x]  Yes  []  No  Next due by: Visit #12       Latex Allergy:  [x]NO      []YES  Preferred Language for Healthcare:   [x]English       []other:    Visit # Insurance Allowable   5 20 VPCY     Pain level: 5/10    SUBJECTIVE:  Patient presents to PT 2 weeks PO from Left TKR . Patient reports she is doing about the same. Knee is only 5/10 pain but she states she still has tenderness in lower calf and posterior to medial malleolus that causes her the most issues. Reports her knee flexion ROM is slowly improving and she has been working on it.         OUTCOME MEASURE DATE DEFICIT   LEFS 18 (pre-op) 79% Deficit   LEFS 8/30/18 PO 90% Deficit       OBJECTIVE:       9/11/18   ROM PROM AROM Overpressure Comment     L R L R L R     Flexion 80    128     ERMI   Extension 0    +4                                                8/30/18  Strength L R Comment   Quad 3/5 5/5 QS   Hamstring 4+ 4+     Gastroc         Hip  flexion 4+ 4+     Hip abd 5 5 seated                          9/5/18  Special Test Results/Comment   Homans Positive, ultrasound ordered   9/7/18: patient found to have positive US for DVT, currently on coumadin for treatment       9/7/18  Girth L R   Mid Patella 38.7 37.5   Suprapatellar 39.4 37.8   5cm above 40.6 40.2   15cm above             TEST INITIAL  8/23/18   GOAL   SINGLE LEG STANCE TIME L: >20\"     R: >20\"   >20 SECONDS   TIMED UP And GO 6.87\"   61-75 y/o: <9sec  66-76 y/o: <10sec  80-81 y/o: <12 sec         Reflexes/Sensation:               [x]Dermatomes/Myotomes intact               [x]Reflexes equal and normal bilaterally              []Other:     Joint mobility:                [x]Normal               []Hypo              []Hyper     Palpation: General PO tenderness     Functional Mobility/Transfers: independent     Posture:  WNL     Bandages/Dressings/Incisions:  Tegaderm bandage removed; steri-strips clean and in place; no signs of drainage or infection 9/5/18     Gait: (include devices/WB status) Partial WB on LLE with SW and shortened stride length 8/30/18     Orthopedic Special Tests: Leg Length: L 39\" , R 38.5\"; 3/8th inch heel lift provided on 9/11/18       RESTRICTIONS/PRECAUTIONS: Bilateral Knee OA    Exercises/Interventions:  Exercise/Equipment Resistance/Repetitions Other comments   Stretching       Hamstring 5 x 30\" Prop   Hip Flexor     ITB     Figure 4     Quad     Inclined Calf     Towel Pull 5 x 30\" Prop           ROM          Active x10 EXT/FLEX   Hang Weights 10' Prop with vaso   Sheet Pulls     Recumbent Bike     Biodex 15' Passive   ERMI 7 x 1' + 1'            Patellar Glides       Medial 3'   Superior 3'     Inferior 3'             Isometrics       Quad sets 2 x 10 x 10\" A/S     Add sets 10 x 10\"             SLR       Supine 3 x 10    Prone     Abduction 3 x 10    Adducton     SLR+               Glutes       Bridges     Supine Clams     S/L Clams     Side Stepping       Monster walks               CKC       Weight Shift NPV    Calf raises NPV    Wall sits     Step ups       Squatting     CC TKE     SL DL               PRE       Extension     Flexion     Leg Press       Cable Column               Balance       Tilt board       SLS      Biodex balance               Other       Bike       Treadmill       Gait Training 10' Cone walking with emphasis on knee flexion        Manual Interventions             Patient Education:  8/23/18: Educated patient on all pre-op and post-op instructions including but not limited to R.I.C.E., post-op HEP, DVT prevention, warning signs of infection and DVT, activity limitations. 8/30/18: full PO instructions with emphasis on RICE and ROM  9/5/18: emphasis on knee flexion ROM 6x/day with extended holds of 1-2 minutes to improve knee flexion ROM  9/11/18: 3/8th inch heel lift provided for RLE; new HEP provided, continued emphasis on flexion ROM    HEP:  Patient instructed on HEP on this date with handout provided and all questions answered. Patient was instructed to contact PT with any complications or questions about HEP moving forward. Patient verbally stated she/he understood.  Updated 9/11/18    Therapeutic Exercise and NMR EXR  [x] (99998) Provided verbal/tactile cueing for activities related to strengthening, flexibility, endurance, ROM for improvements in LE, proximal hip, and core control with self care, mobility, lifting, ambulation.  [] (89047) Provided verbal/tactile cueing for activities related to improving balance, coordination, kinesthetic sense, posture, motor skill, proprioception  to assist with LE, proximal hip, and core control in self care, mobility,

## 2018-09-13 ENCOUNTER — HOSPITAL ENCOUNTER (OUTPATIENT)
Dept: PHYSICAL THERAPY | Age: 63
Setting detail: THERAPIES SERIES
Discharge: HOME | End: 2018-09-13
Payer: COMMERCIAL

## 2018-09-13 LAB
INR BLD: 2.4 (ref 0.8–1.2)
PROTHROMBIN TIME: 26.2 SECONDS (ref 11.7–14.2)

## 2018-09-13 PROCEDURE — 97140 MANUAL THERAPY 1/> REGIONS: CPT

## 2018-09-13 PROCEDURE — 97110 THERAPEUTIC EXERCISES: CPT

## 2018-09-13 PROCEDURE — 97016 VASOPNEUMATIC DEVICE THERAPY: CPT

## 2018-09-13 PROCEDURE — 97530 THERAPEUTIC ACTIVITIES: CPT

## 2018-09-13 PROCEDURE — 97112 NEUROMUSCULAR REEDUCATION: CPT

## 2018-09-18 ENCOUNTER — HOSPITAL ENCOUNTER (OUTPATIENT)
Dept: PHYSICAL THERAPY | Age: 63
Setting detail: THERAPIES SERIES
Discharge: HOME | End: 2018-09-18
Payer: COMMERCIAL

## 2018-09-18 LAB
INR BLD: 1.4 (ref 0.8–1.2)
PROTHROMBIN TIME: 17.4 SECONDS (ref 11.7–14.2)

## 2018-09-18 PROCEDURE — 97112 NEUROMUSCULAR REEDUCATION: CPT

## 2018-09-18 PROCEDURE — 97110 THERAPEUTIC EXERCISES: CPT

## 2018-09-18 PROCEDURE — 97140 MANUAL THERAPY 1/> REGIONS: CPT

## 2018-09-18 PROCEDURE — 97530 THERAPEUTIC ACTIVITIES: CPT

## 2018-09-18 PROCEDURE — 97016 VASOPNEUMATIC DEVICE THERAPY: CPT

## 2018-09-18 NOTE — FLOWSHEET NOTE
The [address] [place] [address]     Physical Therapy Daily Treatment Note  Date:  2018    Patient Name:  Sunil Mueller    :    MRN: [id number]  Restrictions/Precautions:    Medical/Treatment Diagnosis Information:  · Diagnosis: M17.12 (ICD-10-CM) - Primary osteoarthritis of left knee  · Treatment Diagnosis: Decreased functional mobility ; Decreased ADL status; Decreased ROM; Decreased strength;Decreased endurance;Decreased balance;  · S/P Left TKR  · DOS: 18  · Medications: Percocet every 6 hours, Coumadin,   Insurance/Certification information:  PT Insurance Information: PT 2018 BENEFITS/ANTHEM/ ACTIVE/ DED 3450 .37/ OOP 6900 .66/ PAYS 100%/ 20 VPCY/ NO AUTH REQ/ INP#[id number]/ CB 18   Physician Information:  Referring Practitioner: Curtis Cassette of care signed (Y/N): Signed    Date of Patient follow up with Physician: 6, 12 weeks PO  Patient seen in consultation with Dr. Adán Pack who established the initial/subsequent treatment protocol. 18: surgery went well, minimal swelling, knee looks good  18: saw MD upstairs + x-ray taken; Start medrol dose pack; keep pushing ROM, not concerned at this time       G-Code (if applicable):      Date G-Code Applied: 18        Progress Note: [x]  Yes  []  No  Next due by: Visit #12       Latex Allergy:  [x]NO      []YES  Preferred Language for Healthcare:   [x]English       []other:    Visit # Insurance Allowable   7 20 VPCY     Pain level: 4/10 knee, 6/10 ankle    SUBJECTIVE:  Patient presents to PT 3 weeks PO from Left TKR . Patient notes that she is still having pain/discomfort at her ankle but feels it is slowly improving. Reports she pushed her knee ROM this weekend but had increases in pain/discomfort/aching afterwards and would have a hard time getting comfortable.  Also reports new onset of off/on toe numbness on right foot when pressure is applied to dorsum of foot \"there is a nodule there\". OUTCOME MEASURE DATE DEFICIT   LEFS 8/23/18 (pre-op) 79% Deficit   LEFS 8/30/18 PO 90% Deficit       OBJECTIVE:       9/18/18   ROM PROM AROM Overpressure Comment     L R L R L R     Flexion   100   76 128     Cold  ERMI   Extension 0    +4                                                8/30/18  Strength L R Comment   Quad 3/5 5/5 QS   Hamstring 4+ 4+     Gastroc         Hip  flexion 4+ 4+     Hip abd 5 5 seated                          9/5/18  Special Test Results/Comment   Homans Positive, ultrasound ordered   9/7/18: patient found to have positive US for DVT, currently on coumadin for treatment       9/18/18  Girth L R   Mid Patella 39.1 37.5   Suprapatellar 39.4 37.8   5cm above 39.2 40.2   15cm above             TEST INITIAL  8/23/18   GOAL   SINGLE LEG STANCE TIME L: >20\"     R: >20\"   >20 SECONDS   TIMED UP And GO 6.87\"   61-75 y/o: <9sec  66-78 y/o: <10sec  80-81 y/o: <12 sec         Reflexes/Sensation:               [x]Dermatomes/Myotomes intact               [x]Reflexes equal and normal bilaterally              []Other:     Joint mobility:                [x]Normal               []Hypo              []Hyper     Palpation: General PO tenderness     Functional Mobility/Transfers: independent     Posture:  WNL     Bandages/Dressings/Incisions:  Tegaderm bandage removed; steri-strips clean and in place; no signs of drainage or infection 9/5/18     Gait: (include devices/WB status) Partial WB on LLE with SW and shortened stride length 8/30/18     Orthopedic Special Tests: Leg Length: L 39\" , R 38.5\"; 3/8th inch heel lift provided on 9/11/18       RESTRICTIONS/PRECAUTIONS: Bilateral Knee OA    Exercises/Interventions:  Exercise/Equipment Resistance/Repetitions Other comments   Stretching       Hamstring 5 x 30\" Prop   Hip Flexor     ITB     Figure 4     Quad     Inclined Calf     Towel Pull 5 x 30\" Prop   Runner Stretch 3 x 30\"            ROM          Active x15 EXT/FLEX   Hang Weights 10' proximal hip, and core control with self care, mobility, lifting, ambulation.  [] (80483) Provided verbal/tactile cueing for activities related to improving balance, coordination, kinesthetic sense, posture, motor skill, proprioception  to assist with LE, proximal hip, and core control in self care, mobility, lifting, ambulation and eccentric single leg control. NMR and Therapeutic Activities:    [x] (86552 or 74625) Provided verbal/tactile cueing for activities related to improving balance, coordination, kinesthetic sense, posture, motor skill, proprioception and motor activation to allow for proper function of core, proximal hip and LE with self care and ADLs  [] (04527) Gait Re-education- Provided training and instruction to the patient for proper LE, core and proximal hip recruitment and positioning and eccentric body weight control with ambulation re-education including up and down stairs     Home Exercise Program:    [x] (68506) Reviewed/Progressed HEP activities related to strengthening, flexibility, endurance, ROM of core, proximal hip and LE for functional self-care, mobility, lifting and ambulation/stair navigation   [] (20531)Reviewed/Progressed HEP activities related to improving balance, coordination, kinesthetic sense, posture, motor skill, proprioception of core, proximal hip and LE for self care, mobility, lifting, and ambulation/stair navigation      Manual Treatments:  PROM / STM / Oscillations-Mobs:  G-I, II, III, IV (PA's, Inf., Post.)  [] (59141) Provided manual therapy to mobilize LE, proximal hip and/or LS spine soft tissue/joints for the purpose of modulating pain, promoting relaxation,  increasing ROM, reducing/eliminating soft tissue swelling/inflammation/restriction, improving soft tissue extensibility and allowing for proper ROM for normal function with self care, mobility, lifting and ambulation.      Modalities:  Vaso 13' with towel prop    Charges:  Timed Code Treatment Minutes: 61' Total Treatment Minutes: 120'       [] EVAL (LOW) 02608 (typically 20 minutes face-to-face)  [] EVAL (MOD) 52079 (typically 30 minutes face-to-face)  [] EVAL (HIGH) 17016 (typically 45 minutes face-to-face)  [] RE-EVAL   [x] JQ(64876) x  1   [] IONTO  [x] NMR (83947) x  1   [x] VASO  [x] Manual (44434) x  1    [] Other:  [x] TA x  1    [] Mech Traction (17575)  [] ES(attended) (09029)      [] ES (un) (18038):     GOALS:  Patient stated goal: Return to walking for exercise without pain    Therapist goals for Patient:   Short Term Goals: To be achieved in: 2 weeks  1. Independent in HEP and progression per patient tolerance, in order to prevent re-injury. MET  2. Patient will have a decrease in pain at rest after surgery to <5/10  to facilitate improvement in movement, function, and ADLs as indicated by Functional Deficits. MET     Long Term Goals: To be achieved in: 12 weeks  1. Disability index score of 30% or less for the LEFS to assist with reaching prior level of function. 2. Patient will demonstrate increased AROM to 0-120 or greater to allow for proper joint functioning as indicated by patients Functional Deficits. 3. Patient will demonstrate an increase in Strength to good proximal hip strength and control in LE to allow for proper functional mobility as indicated by patients Functional Deficits. 4. Patient will return to walking >30 minutes without increased symptoms or restriction. (patient specific functional goal)   5. Patient will perform SL balance > 20 seconds bilaterally and TUG test at pre-op time or better to demonstrate return of baseline strength and balance. Progression Towards Functional goals:  [] Patient is progressing as expected towards functional goals listed. [] Progression is slowed due to complexities listed. [] Progression has been slowed due to co-morbidities.   [x] Plan just implemented, too soon to assess goals progression  [] Other:     ASSESSMENT:  Improved tolerance

## 2018-09-20 ENCOUNTER — HOSPITAL ENCOUNTER (OUTPATIENT)
Dept: PHYSICAL THERAPY | Age: 63
Setting detail: THERAPIES SERIES
Discharge: HOME | End: 2018-09-20
Payer: COMMERCIAL

## 2018-09-20 DIAGNOSIS — M17.12 PRIMARY OSTEOARTHRITIS OF LEFT KNEE: Primary | ICD-10-CM

## 2018-09-20 LAB
INR BLD: 1.4 (ref 0.8–1.2)
PROTHROMBIN TIME: 17.3 SECONDS (ref 11.7–14.2)

## 2018-09-20 PROCEDURE — 97140 MANUAL THERAPY 1/> REGIONS: CPT

## 2018-09-20 PROCEDURE — 97016 VASOPNEUMATIC DEVICE THERAPY: CPT

## 2018-09-20 PROCEDURE — 97530 THERAPEUTIC ACTIVITIES: CPT

## 2018-09-20 PROCEDURE — 97110 THERAPEUTIC EXERCISES: CPT

## 2018-09-20 PROCEDURE — 97112 NEUROMUSCULAR REEDUCATION: CPT

## 2018-09-20 RX ORDER — OXYCODONE HYDROCHLORIDE AND ACETAMINOPHEN 5; 325 MG/1; MG/1
1 TABLET ORAL EVERY 6 HOURS PRN
Qty: 28 TABLET | Refills: 0 | Status: SHIPPED | OUTPATIENT
Start: 2018-09-20 | End: 2018-09-27

## 2018-09-20 NOTE — FLOWSHEET NOTE
[address] [facility]     Physical Therapy Daily Treatment Note  Date:  2018    Patient Name:  Caitlin Jackman    :    MRN: [id number]  Restrictions/Precautions:    Medical/Treatment Diagnosis Information:  · Diagnosis: M17.12 (ICD-10-CM) - Primary osteoarthritis of left knee  · Treatment Diagnosis: Decreased functional mobility ; Decreased ADL status; Decreased ROM; Decreased strength;Decreased endurance;Decreased balance;  · S/P Left TKR  · DOS: 18  · Medications: Percocet after PT and/or in evening, Tylenol inbetween, Coumadin,   Insurance/Certification information:  PT Insurance Information: PT 2018 BENEFITS/ANTHEM/ ACTIVE/ DED 3450 .37/ OOP 6900 .66/ PAYS 100%/ 20 VPCY/ NO AUTH [place]/ JOSEFINA#[id number]/ CB 18   Physician Information:  Referring Practitioner: Corine Villanueva of care signed (Y/N): Signed    Date of Patient follow up with Physician: 6, 12 weeks PO  Patient seen in consultation with Dr. Stoney Coronel who established the initial/subsequent treatment protocol. 18: surgery went well, minimal swelling, knee looks good  18: saw MD upstairs + x-ray taken; Start medrol dose pack; keep pushing ROM, not concerned at this time       G-Code (if applicable):      Date G-Code Applied: 18        Progress Note: [x]  Yes  []  No  Next due by: Visit #12       Latex Allergy:  [x]NO      []YES  Preferred Language for Healthcare:   [x]English       []other:    Visit # Insurance Allowable   8 20 VPCY     Pain level: 4/10 knee, 6/10 ankle    SUBJECTIVE:  Patient presents to PT 3+ weeks PO from Left TKR . Doing well today. No significant changes since PT on Tuesday. Does feel her ankle improving some and likes the manual mobilizations done by PT last visit.         OUTCOME MEASURE DATE DEFICIT   LEFS 18 (pre-op) 79% Deficit   LEFS 18 PO 90% Deficit       OBJECTIVE:       18   ROM PROM AROM Overpressure Comment     L A/S     Add sets 10 x 10\"             SLR       Supine 3 x 10 QS each rep, 1#   Prone     Abduction 3 x 10 1#   Adducton     SLR+               Glutes       Bridges     Supine Clams     S/L Clams     Side Stepping       Monster walks               CKC       Calf raises 1 x 10 Biodex %WB   Wall sits     Step ups        Hold d/t R Knee PainCC TKE 3 x 10 Black   SL DL               PRE       Extension 3 x 10 RANGE: Avail   Flexion 3 x 10 RANGE: Avail, Standing, 0#   Leg Press       Cable Column               Balance       Tilt board       SLS      Biodex balance               Other       Bike       Treadmill       Gait Training 10' Cone walking with emphasis on knee flexion FWW        Manual Interventions     Foot/ankle mobs 5' Talocrural distraction, metatarsal mobilizations           Patient Education:  8/23/18: Educated patient on all pre-op and post-op instructions including but not limited to R.I.C.E., post-op HEP, DVT prevention, warning signs of infection and DVT, activity limitations. 8/30/18: full PO instructions with emphasis on RICE and ROM  9/5/18: emphasis on knee flexion ROM 6x/day with extended holds of 1-2 minutes to improve knee flexion ROM  9/11/18: 3/8th inch heel lift provided for RLE; new HEP provided, continued emphasis on flexion ROM    HEP:  Patient instructed on HEP on this date with handout provided and all questions answered. Patient was instructed to contact PT with any complications or questions about HEP moving forward. Patient verbally stated she/he understood.  Updated 9/11/18    Therapeutic Exercise and NMR EXR  [x] (23221) Provided verbal/tactile cueing for activities related to strengthening, flexibility, endurance, ROM for improvements in LE, proximal hip, and core control with self care, mobility, lifting, ambulation.  [] (95480) Provided verbal/tactile cueing for activities related to improving balance, coordination, kinesthetic sense, posture, motor skill, proprioception  to

## 2018-09-20 NOTE — PROGRESS NOTES
Discuss with patient that she does need to ween of the Percocet and use tylenol for pain now.  She is post TKR

## 2018-09-21 ENCOUNTER — APPOINTMENT (OUTPATIENT)
Dept: PHYSICAL THERAPY | Age: 63
End: 2018-09-21
Payer: COMMERCIAL

## 2018-09-25 ENCOUNTER — HOSPITAL ENCOUNTER (OUTPATIENT)
Dept: PHYSICAL THERAPY | Age: 63
Setting detail: THERAPIES SERIES
Discharge: HOME | End: 2018-09-25
Payer: COMMERCIAL

## 2018-09-25 LAB
INR BLD: 1.4 (ref 0.8–1.2)
PROTHROMBIN TIME: 17.2 SECONDS (ref 11.7–14.2)

## 2018-09-25 PROCEDURE — 97112 NEUROMUSCULAR REEDUCATION: CPT

## 2018-09-25 PROCEDURE — 97530 THERAPEUTIC ACTIVITIES: CPT

## 2018-09-25 PROCEDURE — 97110 THERAPEUTIC EXERCISES: CPT

## 2018-09-25 PROCEDURE — 97140 MANUAL THERAPY 1/> REGIONS: CPT

## 2018-09-25 PROCEDURE — G0283 ELEC STIM OTHER THAN WOUND: HCPCS

## 2018-09-25 NOTE — FLOWSHEET NOTE
LEFS 8/23/18 (pre-op) 79% Deficit   LEFS 8/30/18 PO 90% Deficit       OBJECTIVE:       9/25/18   ROM PROM AROM Overpressure Comment     L R L R L R     Flexion   107   83 128     Cold  ERMI   Extension 0    +4                                                8/30/18  Strength L R Comment   Quad 3/5 5/5 QS   Hamstring 4+ 4+     Gastroc         Hip  flexion 4+ 4+     Hip abd 5 5 seated                          9/5/18  Special Test Results/Comment   Homans Positive, ultrasound ordered   9/7/18: patient found to have positive US for DVT, currently on coumadin for treatment       9/25/18  Girth L R   Mid Patella 38.6 37.5   Suprapatellar 38.7 37.8   5cm above 39.0 40.2   15cm above             TEST INITIAL  8/23/18   GOAL   SINGLE LEG STANCE TIME L: >20\"     R: >20\"   >20 SECONDS   TIMED UP And GO 6.87\"   61-77 y/o: <9sec  66-76 y/o: <10sec  80-81 y/o: <12 sec         Reflexes/Sensation:               [x]Dermatomes/Myotomes intact               [x]Reflexes equal and normal bilaterally              []Other:     Joint mobility:                [x]Normal               []Hypo              []Hyper     Palpation: General PO tenderness     Functional Mobility/Transfers: independent     Posture:  WNL     Bandages/Dressings/Incisions:  Incision healing well, no signs of infection 9/25/18     Gait: (include devices/WB status) Near equal WB using FWW with slight decrease in knee flexion 9/25/18     Orthopedic Special Tests: Leg Length: L 39\" , R 38.5\"; 3/8th inch heel lift provided on 9/11/18       RESTRICTIONS/PRECAUTIONS: Bilateral Knee OA    Exercises/Interventions:  Exercise/Equipment Resistance/Repetitions Other comments   Stretching       Hamstring 5 x 30\" Prop   Hip Flexor     ITB     Figure 4     Quad 5 x 30\" Towel under thigh   Inclined Calf     Towel Pull 5 x 30\" Prop              ROM          Active x15 EXT/FLEX   Hang Weights 10' Prop with vaso   Sheet Pulls     Recumbent Bike     Biodex 15' Passive   ERMI 7 x 1' + 1'         Patellar Glides       Medial 3'     Superior 3'     Inferior 3'             Isometrics       Quad sets 2 x 10 x 10\" A/S     Add sets 10 x 10\"             SLR       Supine 3 x 10 QS each rep, 1#   Prone     Abduction 3 x 10 1#   Adducton     SLR+               Glutes       Bridges     Supine Clams     S/L Clams     Side Stepping       Monster walks               CKC       Calf raises 3 x 10    Wall sits NPV    Step ups        Hold d/t R Knee PainCC TKE 3 x 10 Black   SL DL               PRE       Extension 3 x 10 RANGE: Avail, 1#   Flexion 3 x 10 RANGE: Avail, Standing, 0#   Leg Press       Cable Column               Balance       Tilt board       Tandem Balance 3 x 20\" each     Biodex balance               Other       Bike       Treadmill       Gait Training 10' Cone walking with emphasis on knee flexion FWW        Manual Interventions     Foot/ankle mobs 5' Talocrural distraction, metatarsal mobilizations           Patient Education:  8/23/18: Educated patient on all pre-op and post-op instructions including but not limited to R.I.C.E., post-op HEP, DVT prevention, warning signs of infection and DVT, activity limitations. 8/30/18: full PO instructions with emphasis on RICE and ROM  9/5/18: emphasis on knee flexion ROM 6x/day with extended holds of 1-2 minutes to improve knee flexion ROM  9/11/18: 3/8th inch heel lift provided for RLE; new HEP provided, continued emphasis on flexion ROM  9/25/18: updated HEP; encouraged patient to add quad stretch prior to all knee flexion ROM OP sets at home    HEP:  Patient instructed on HEP on this date with handout provided and all questions answered. Patient was instructed to contact PT with any complications or questions about HEP moving forward. Patient verbally stated she/he understood.  Updated 9/25/18    Therapeutic Exercise and NMR EXR  [x] (18494) Provided verbal/tactile cueing for activities related to strengthening, flexibility, endurance, ROM for prop    Charges:  Timed Code Treatment Minutes: 79'   Total Treatment Minutes: 130'       [] EVAL (LOW) 29200 (typically 20 minutes face-to-face)  [] EVAL (MOD) 66828 (typically 30 minutes face-to-face)  [] EVAL (HIGH) 58537 (typically 45 minutes face-to-face)  [] RE-EVAL   [x] VK(61857) x  2   [] IONTO  [x] NMR (03479) x  1   [] VASO  [x] Manual (89387) x  1    [] Other:  [x] TA x  1    [] Mech Traction (36586)  [] ES(attended) (02535)      [x] ES (un) (39767):     GOALS:  Patient stated goal: Return to walking for exercise without pain    Therapist goals for Patient:   Short Term Goals: To be achieved in: 2 weeks  1. Independent in HEP and progression per patient tolerance, in order to prevent re-injury. MET  2. Patient will have a decrease in pain at rest after surgery to <5/10  to facilitate improvement in movement, function, and ADLs as indicated by Functional Deficits. MET     Long Term Goals: To be achieved in: 12 weeks  1. Disability index score of 30% or less for the LEFS to assist with reaching prior level of function. 2. Patient will demonstrate increased AROM to 0-120 or greater to allow for proper joint functioning as indicated by patients Functional Deficits. 3. Patient will demonstrate an increase in Strength to good proximal hip strength and control in LE to allow for proper functional mobility as indicated by patients Functional Deficits. 4. Patient will return to walking >30 minutes without increased symptoms or restriction. (patient specific functional goal)   5. Patient will perform SL balance > 20 seconds bilaterally and TUG test at pre-op time or better to demonstrate return of baseline strength and balance. Progression Towards Functional goals:  [] Patient is progressing as expected towards functional goals listed. [] Progression is slowed due to complexities listed. [] Progression has been slowed due to co-morbidities.   [x] Plan just implemented, too soon to assess goals progression  [] Other:     ASSESSMENT:  Continues to have improvement to tolerance to therapy sessions. Her knee flexion ROM improved today both cold and on ERMI demonstrating her compliance with home flexion OP program. Patient had much improved gait pattern throughout session today, with less cueing needed by PT for symmetrical stepping or increasing knee fleixon. Symptoms in patient's ankle are also improving as her gait pattern improves. Added NMES end of session this visit due to continued lack of VMO activation with quad set and SLR flexion. Patient still requires further therapy to address decreased ROM, decreased strength, increased pain, increased swelling and impaired gait mechanics.        Treatment/Activity Tolerance:  [] Patient tolerated treatment well [] Patient limited by fatique  [x] Patient limited by pain  [] Patient limited by other medical complications  [] Other:     Prognosis: [x] Good [] Fair  [] Poor    Patient Requires Follow-up: [x] Yes  [] No    PLAN: See eval  [] Continue per plan of care [] Alter current plan (see comments)  [x] Plan of care initiated [] Hold pending MD visit [] Discharge    Electronically signed by: Tyree Junior PT, DPT

## 2018-09-27 ENCOUNTER — HOSPITAL ENCOUNTER (OUTPATIENT)
Dept: PHYSICAL THERAPY | Age: 63
Setting detail: THERAPIES SERIES
Discharge: HOME | End: 2018-09-27
Payer: COMMERCIAL

## 2018-09-27 LAB
INR BLD: 1.1 (ref 0.8–1.2)
PROTHROMBIN TIME: 14.3 SECONDS (ref 11.7–14.2)

## 2018-09-27 PROCEDURE — 97140 MANUAL THERAPY 1/> REGIONS: CPT

## 2018-09-27 PROCEDURE — G8979 MOBILITY GOAL STATUS: HCPCS

## 2018-09-27 PROCEDURE — 97016 VASOPNEUMATIC DEVICE THERAPY: CPT

## 2018-09-27 PROCEDURE — 97530 THERAPEUTIC ACTIVITIES: CPT

## 2018-09-27 PROCEDURE — 97110 THERAPEUTIC EXERCISES: CPT

## 2018-09-27 PROCEDURE — G8978 MOBILITY CURRENT STATUS: HCPCS

## 2018-09-27 PROCEDURE — G0283 ELEC STIM OTHER THAN WOUND: HCPCS

## 2018-09-27 PROCEDURE — 97112 NEUROMUSCULAR REEDUCATION: CPT

## 2018-09-27 NOTE — PLAN OF CARE
The [address] and [facility]   Physical Therapy Re-Certification Plan of Care    Dear  Dr. Sebastian Bosch,    We had the pleasure of treating the following patient for physical therapy services at [address] [place] [address]. A summary of our findings can be found in the updated assessment below. This includes our plan of care. If you have any questions or concerns regarding these findings, please do not hesitate to contact me at [phone]. Thank you for the referral.     Physician Signature:________________________________Date:__________________  By signing above (or electronic signature), therapists plan is approved by physician      Overall Response to Treatment:   [x]Patient is responding well to treatment and improvement is noted with regards  to goals   []Patient should continue to improve in reasonable time if they continue HEP   []Patient has plateaued and is no longer responding to skilled PT intervention    []Patient is getting worse and would benefit from return to referring MD   []Patient unable to adhere to initial POC   [x]Other:   Patient now 1 month PO from Left TKR. She continues to have difficulty with her flexion ROM but has made small, steady increases each week. She continues to have difficulty with ankle pain and calf/achilles stiffness that limits her functional mobility, but this has started to improve in past week since transitioning from  to [facility]. Patient has made improvements in LE strength but still has significant deficits, particularly in her quadriceps muscle, due to her surgery pain/swelling. At this time, patient would benefit from further skilled therapy to address her deficits in strength, ROM, flexibility, balance, and functional mobility to help her transition safely back to her [facility].     Date range of Visits: 8/23/18-9/27/18  Total Visits: 10     Physical Therapy Daily Treatment Note  Date:  9/27/2018    Patient Name: verbal/tactile cueing for activities related to strengthening, flexibility, endurance, ROM for improvements in LE, proximal hip, and core control with self care, mobility, lifting, ambulation.  [] (01306) Provided verbal/tactile cueing for activities related to improving balance, coordination, kinesthetic sense, posture, motor skill, proprioception  to assist with LE, proximal hip, and core control in self care, mobility, lifting, ambulation and eccentric single leg control.      NMR and Therapeutic Activities:    [x] (93873 or 14418) Provided verbal/tactile cueing for activities related to improving balance, coordination, kinesthetic sense, posture, motor skill, proprioception and motor activation to allow for proper function of core, proximal hip and LE with self care and ADLs  [] (67769) Gait Re-education- Provided training and instruction to the patient for proper LE, core and proximal hip recruitment and positioning and eccentric body weight control with ambulation re-education including up and down stairs     Home Exercise Program:    [x] (91737) Reviewed/Progressed HEP activities related to strengthening, flexibility, endurance, ROM of core, proximal hip and LE for functional self-care, mobility, lifting and ambulation/stair navigation   [] (24304)Reviewed/Progressed HEP activities related to improving balance, coordination, kinesthetic sense, posture, motor skill, proprioception of core, proximal hip and LE for self care, mobility, lifting, and ambulation/stair navigation      Manual Treatments:  PROM / STM / Oscillations-Mobs:  G-I, II, III, IV (PA's, Inf., Post.)  [] (48319) Provided manual therapy to mobilize LE, proximal hip and/or LS spine soft tissue/joints for the purpose of modulating pain, promoting relaxation,  increasing ROM, reducing/eliminating soft tissue swelling/inflammation/restriction, improving soft tissue extensibility and allowing for proper ROM for normal function with self care, stiffness, ankle pain/calf tightness  [] Progression has been slowed due to co-morbidities. [] Plan just implemented, too soon to assess goals progression  [] Other:     ASSESSMENT: See above. Patient still requires further therapy to address decreased ROM, decreased strength, increased pain, increased swelling and impaired gait mechanics.        Treatment/Activity Tolerance:  [x] Patient tolerated treatment well [] Patient limited by fatique  [x] Patient limited by pain  [] Patient limited by other medical complications  [] Other:     Prognosis: [x] Good [] Fair  [] Poor    Patient Requires Follow-up: [x] Yes  [] No    PLAN: See eval  [] Continue per plan of care [] Alter current plan (see comments)  [x] Plan of care initiated [] Hold pending MD visit [] Discharge    Electronically signed by: Aurelia Rosales PT, DPT

## 2018-10-02 ENCOUNTER — HOSPITAL ENCOUNTER (OUTPATIENT)
Dept: PHYSICAL THERAPY | Age: 63
Setting detail: THERAPIES SERIES
Discharge: HOME | End: 2018-10-02
Payer: COMMERCIAL

## 2018-10-02 DIAGNOSIS — M17.12 PRIMARY OSTEOARTHRITIS OF LEFT KNEE: Primary | ICD-10-CM

## 2018-10-02 LAB
INR BLD: 1.1 (ref 0.8–1.2)
PROTHROMBIN TIME: 14.8 SECONDS (ref 11.7–14.2)

## 2018-10-02 PROCEDURE — 97110 THERAPEUTIC EXERCISES: CPT

## 2018-10-02 PROCEDURE — 97530 THERAPEUTIC ACTIVITIES: CPT

## 2018-10-02 PROCEDURE — 97112 NEUROMUSCULAR REEDUCATION: CPT

## 2018-10-02 PROCEDURE — 97140 MANUAL THERAPY 1/> REGIONS: CPT

## 2018-10-02 PROCEDURE — 97016 VASOPNEUMATIC DEVICE THERAPY: CPT

## 2018-10-02 PROCEDURE — G0283 ELEC STIM OTHER THAN WOUND: HCPCS

## 2018-10-02 RX ORDER — METHYLPREDNISOLONE 4 MG/1
TABLET ORAL
Qty: 1 KIT | Refills: 0 | Status: SHIPPED | OUTPATIENT
Start: 2018-10-02 | End: 2018-10-08

## 2018-10-02 NOTE — FLOWSHEET NOTE
that began over the weekend. OUTCOME MEASURE DATE DEFICIT   LEFS 8/23/18 (pre-op) 79% Deficit   LEFS 8/30/18 PO 90% Deficit   LEFS 9/27/18 66% Deficit       OBJECTIVE:       10/2/18  ROM PROM AROM Overpressure Comment     L R L R L R     Flexion   113   88 128     Cold  ERMI   Extension 0    +4                                                8/30/18  Strength L R Comment   Quad 3/5 5/5 QS   Hamstring 4+ 4+     Gastroc         Hip  flexion 4+ 4+     Hip abd 5 5 seated                          9/5/18  Special Test Results/Comment   Homans Positive, ultrasound ordered   9/7/18: patient found to have positive US for DVT, currently on coumadin for treatment       10/2/18  Girth L R   Mid Patella 39.6 36.3   Suprapatellar 39.7 36.5   5cm above 39.2 37.6   15cm above             TEST INITIAL  8/23/18   GOAL   SINGLE LEG STANCE TIME L: >20\"     R: >20\"   >20 SECONDS   TIMED UP And GO 6.87\"   61-75 y/o: <9sec  66-76 y/o: <10sec  80-79 y/o: <12 sec         Reflexes/Sensation:               [x]Dermatomes/Myotomes intact               [x]Reflexes equal and normal bilaterally              []Other:     Joint mobility:                [x]Normal               []Hypo              []Hyper     Palpation: General PO tenderness     Functional Mobility/Transfers: independent     Posture:  WNL     Bandages/Dressings/Incisions:  Incision healing well, no signs of infection 9/25/18     Gait: (include devices/WB status) Near equal WB using FWW with slight decrease in knee flexion 9/25/18     Orthopedic Special Tests: Leg Length: L 39\" , R 38.5\"; 3/8th inch heel lift provided on 9/11/18       RESTRICTIONS/PRECAUTIONS: Bilateral Knee OA    Exercises/Interventions:  Exercise/Equipment Resistance/Repetitions Other comments   Stretching       Hamstring 5 x 30\" Prop   Hip Flexor     ITB     Figure 4     Quad 5 x 30\" Towel under thigh   Inclined Calf 5 x 30\"               ROM          Active x15 EXT/FLEX   Sheet Pulls     Recumbent Bike 10' AAROM, seat 14   ERMI 5 x 1' + 1'            Patellar Glides       Medial 3'     Superior 3'     Inferior 3'             Isometrics                       SLR       Supine 3 x 10 QS each rep, 2#   Prone     Abduction 3 x 10 2#   Adducton     SLR+               Glutes       Bridges     Supine Clams     S/L Clams     Side Stepping       Monster walks               CKC       Calf raises 3 x 10    Wall sits 3 x 30\"    Step ups 3 x 10 4\"    Hold d/t R Knee PainCC TKE 3 x 10 Black   SL DL               PRE       Extension 3 x 10 RANGE: Avail, 2#   Flexion 3 x 10 RANGE: Avail, Standing, 2#   Leg Press      Cable Column               Balance       Tilt board       Tandem Balance 3 x 30\" each     Biodex balance               Other       Bike       Treadmill       Gait Training 10' Cone walking, No AD        Manual Interventions     Foot/ankle mobs 5' Talocrural distraction, metatarsal mobilizations           Patient Education:  8/23/18: Educated patient on all pre-op and post-op instructions including but not limited to R.I.C.E., post-op HEP, DVT prevention, warning signs of infection and DVT, activity limitations. 8/30/18: full PO instructions with emphasis on RICE and ROM  9/5/18: emphasis on knee flexion ROM 6x/day with extended holds of 1-2 minutes to improve knee flexion ROM  9/11/18: 3/8th inch heel lift provided for RLE; new HEP provided, continued emphasis on flexion ROM  9/25/18: updated HEP; encouraged patient to add quad stretch prior to all knee flexion ROM OP sets at home    HEP:  Patient instructed on HEP on this date with handout provided and all questions answered. Patient was instructed to contact PT with any complications or questions about HEP moving forward. Patient verbally stated she/he understood.  Updated 9/25/18    Therapeutic Exercise and NMR EXR  [x] (90884) Provided verbal/tactile cueing for activities related to strengthening, flexibility, endurance, ROM for improvements in LE, proximal hip, and Minutes: 130'       [] EVAL (LOW) 18291 (typically 20 minutes face-to-face)  [] EVAL (MOD) 45598 (typically 30 minutes face-to-face)  [] EVAL (HIGH) 55058 (typically 45 minutes face-to-face)  [] RE-EVAL   [x] VU(13579) x  2   [] IONTO  [x] NMR (21772) x  1   [x] VASO  [x] Manual (22414) x  1    [] Other:  [x] TA x  1    [] Mech Traction (37398)  [] ES(attended) (17578)      [x] ES (un) (87500):     GOALS:  Patient stated goal: Return to walking for exercise without pain    Therapist goals for Patient:   Short Term Goals: To be achieved in: 2 weeks  1. Independent in HEP and progression per patient tolerance, in order to prevent re-injury. MET  2. Patient will have a decrease in pain at rest after surgery to <5/10  to facilitate improvement in movement, function, and ADLs as indicated by Functional Deficits. MET     Long Term Goals: To be achieved in: 12 weeks  1. Disability index score of 30% or less for the LEFS to assist with reaching prior level of function. NOT MET  2. Patient will demonstrate increased AROM to 0-120 or greater to allow for proper joint functioning as indicated by patients Functional Deficits. NOT MET  3. Patient will demonstrate an increase in Strength to good proximal hip strength and control in LE to allow for proper functional mobility as indicated by patients Functional Deficits. NOT MET  4. Patient will return to walking >30 minutes without increased symptoms or restriction. (patient specific functional goal) NOT MET  5. Patient will perform SL balance > 20 seconds bilaterally and TUG test at pre-op time or better to demonstrate return of baseline strength and balance. NOT TESTED    Progression Towards Functional goals:  [] Patient is progressing as expected towards functional goals listed. [x] Progression is slowed due to complexities listed: DVT, stiffness, ankle pain/calf tightness  [] Progression has been slowed due to co-morbidities.   [] Plan just implemented, too soon to assess

## 2018-10-05 ENCOUNTER — HOSPITAL ENCOUNTER (OUTPATIENT)
Dept: PHYSICAL THERAPY | Age: 63
Setting detail: THERAPIES SERIES
Discharge: HOME | End: 2018-10-05
Payer: COMMERCIAL

## 2018-10-05 LAB
INR BLD: 1.2 (ref 0.8–1.2)
PROTHROMBIN TIME: 14.9 SECONDS (ref 11.7–14.2)

## 2018-10-05 PROCEDURE — G0283 ELEC STIM OTHER THAN WOUND: HCPCS

## 2018-10-05 PROCEDURE — 97530 THERAPEUTIC ACTIVITIES: CPT

## 2018-10-05 PROCEDURE — 97112 NEUROMUSCULAR REEDUCATION: CPT

## 2018-10-05 PROCEDURE — 97140 MANUAL THERAPY 1/> REGIONS: CPT

## 2018-10-05 PROCEDURE — 97016 VASOPNEUMATIC DEVICE THERAPY: CPT

## 2018-10-05 PROCEDURE — 97110 THERAPEUTIC EXERCISES: CPT

## 2018-10-05 NOTE — FLOWSHEET NOTE
Patellar Glides       Medial 3'     Superior 3'     Inferior 3'             Isometrics                       SLR       Supine 3 x 10 QS each rep, 2#   Prone     Abduction 3 x 10 2#   Adducton     SLR+               Glutes       Bridges     Supine Clams     S/L Clams     Side Stepping       Monster walks               CKC       Calf raises 3 x 10    Wall sits 3 x 30\"    Step ups 3 x 10 6\"    Hold d/t R Knee PainCC TKE 3 x 10 Silver   SL DL               PRE       Extension 3 x 10 RANGE: Avail, 2#   Flexion 3 x 10 RANGE: Avail, Standing, 2#   Leg Press      Cable Column               Balance       Tilt board       Tandem Balance 3 x 30\" each     Biodex balance               Other       Bike       Treadmill       Gait Training 10' Cone walking, No AD        Manual Interventions     IASTM 5' Quad, VMO   Foot/ankle mobs 5' Talocrural distraction, metatarsal mobilizations           Patient Education:  8/23/18: Educated patient on all pre-op and post-op instructions including but not limited to R.I.C.E., post-op HEP, DVT prevention, warning signs of infection and DVT, activity limitations. 8/30/18: full PO instructions with emphasis on RICE and ROM  9/5/18: emphasis on knee flexion ROM 6x/day with extended holds of 1-2 minutes to improve knee flexion ROM  9/11/18: 3/8th inch heel lift provided for RLE; new HEP provided, continued emphasis on flexion ROM  9/25/18: updated HEP; encouraged patient to add quad stretch prior to all knee flexion ROM OP sets at home    HEP:  Patient instructed on HEP on this date with handout provided and all questions answered. Patient was instructed to contact PT with any complications or questions about HEP moving forward. Patient verbally stated she/he understood.  Updated 9/25/18    Therapeutic Exercise and NMR EXR  [x] (40476) Provided verbal/tactile cueing for activities related to strengthening, flexibility, endurance, ROM for improvements in LE, proximal hip, and core control with

## 2018-10-09 ENCOUNTER — HOSPITAL ENCOUNTER (OUTPATIENT)
Dept: PHYSICAL THERAPY | Age: 63
Setting detail: THERAPIES SERIES
Discharge: HOME | End: 2018-10-09
Payer: COMMERCIAL

## 2018-10-09 LAB
INR BLD: 1 (ref 0.8–1.2)
PROTHROMBIN TIME: 13.7 SECONDS (ref 11.7–14.2)

## 2018-10-09 PROCEDURE — 97110 THERAPEUTIC EXERCISES: CPT

## 2018-10-09 PROCEDURE — 97140 MANUAL THERAPY 1/> REGIONS: CPT

## 2018-10-09 PROCEDURE — 97112 NEUROMUSCULAR REEDUCATION: CPT

## 2018-10-09 PROCEDURE — 97016 VASOPNEUMATIC DEVICE THERAPY: CPT

## 2018-10-09 PROCEDURE — 97530 THERAPEUTIC ACTIVITIES: CPT

## 2018-10-09 PROCEDURE — G0283 ELEC STIM OTHER THAN WOUND: HCPCS

## 2018-10-09 NOTE — FLOWSHEET NOTE
The [facility]    Physical Therapy Daily Treatment Note  Date:  10/9/2018    Patient Name:  Niharika Michelle    :  [birth year]  MRN: [id number]  Restrictions/Precautions:    Medical/Treatment Diagnosis Information:  · Diagnosis: M17.12 (ICD-10-CM) - Primary osteoarthritis of left knee  · Treatment Diagnosis: Decreased functional mobility ; Decreased ADL status; Decreased ROM; Decreased strength;Decreased endurance;Decreased balance;  · S/P Left TKR  · DOS: 18  · Medications: Percocet before PT visits, Tylenol every 3-4 hours, Coumadin,   Insurance/Certification information:  PT Insurance Information: PT 2018 BENEFITS/ANTHEM/ ACTIVE/ DED 3450 .37/ OOP 6900 .66/ PAYS 100%/ 20 VPCY/ NO AUTH REQ/ CXW#[id number]/ CB 18   Physician Information:  Referring Practitioner: Alex Rogers of care signed (Y/N): Signed    Date of Patient follow up with Physician: 12 weeks PO  Patient seen in consultation with Dr. Aziza Vazquez who established the initial/subsequent treatment protocol. 18: surgery went well, minimal swelling, knee looks good  18: saw MD upstairs + x-ray taken; Start medrol dose pack; keep pushing ROM, not concerned at this time   10-2-18: discussion re: meds for pain, will repeat Doppler this week, given medrol dose pack, feels she is improving      G-Code (if applicable):      Date G-Code Applied: 18       Progress Note: [x]  Yes  []  No  Next due by: Visit #20       Latex Allergy:  [x]NO      []YES  Preferred Language for Healthcare:   [x]English       []other:    Visit # Insurance Allowable   13 20 VPCY     Pain level:3/10    SUBJECTIVE:  Patient presents to PT 6 weeks PO from Left TKR . Continues to do well overall. No new issues to report since PT on Friday. Reports IASTM completely relieved quadriceps tightness/restriciton and it has not re-appeared.          OUTCOME MEASURE DATE DEFICIT   LEFS 18 (pre-op) 79% Deficit   LEFS 8/30/18 PO 90% Deficit   LEFS 9/27/18 66% Deficit       OBJECTIVE:       10/9/18  ROM PROM AROM Overpressure Comment     L R L R L R     Flexion   117   100 128     Cold  ERMI   Extension 0    +4                                                8/30/18  Strength L R Comment   Quad 3/5 5/5 QS   Hamstring 4+ 4+     Gastroc         Hip  flexion 4+ 4+     Hip abd 5 5 seated                          9/5/18  Special Test Results/Comment   Homans Positive, ultrasound ordered   9/7/18: patient found to have positive US for DVT, currently on coumadin for treatment       10/2/18  Girth L R   Mid Patella 39.6 36.3   Suprapatellar 39.7 36.5   5cm above 39.2 37.6   15cm above             TEST INITIAL  8/23/18   GOAL   SINGLE LEG STANCE TIME L: >20\"     R: >20\"   >20 SECONDS   TIMED UP And GO 6.87\"   61-77 y/o: <9sec  66-76 y/o: <10sec  80-81 y/o: <12 sec         Reflexes/Sensation:               [x]Dermatomes/Myotomes intact               [x]Reflexes equal and normal bilaterally              []Other:     Joint mobility:                [x]Normal               []Hypo              []Hyper     Palpation: General PO tenderness     Functional Mobility/Transfers: independent     Posture:  WNL     Bandages/Dressings/Incisions:  Incision healing well, no signs of infection 9/25/18     Gait: (include devices/WB status) Near equal WB using FWW with slight decrease in knee flexion 9/25/18     Orthopedic Special Tests: Leg Length: L 39\" , R 38.5\"; 3/8th inch heel lift provided on 9/11/18       RESTRICTIONS/PRECAUTIONS: Bilateral Knee OA    Exercises/Interventions:  Exercise/Equipment Resistance/Repetitions Other comments   Stretching       Hamstring 5 x 30\" Prop   Hip Flexor     ITB     Figure 4     Quad 5 x 30\" Towel under thigh   Inclined Calf 5 x 30\"               ROM          Active x15 EXT/FLEX   Sheet Pulls     Recumbent Bike 10' AAROM, seat 14   ERMI 5 x 1' + 1'            Patellar Glides       Medial 3'     Superior 3'

## 2018-10-10 ENCOUNTER — TELEPHONE (OUTPATIENT)
Dept: ORTHOPEDIC SURGERY | Age: 63
End: 2018-10-10

## 2018-10-10 NOTE — TELEPHONE ENCOUNTER
Called patient and told her Dr Elvira Red wants her to continue with the coumadin for 3 months. She will continue with5 mg daily and weekly blood draws.  Called in Prednisone 10 mg 1 tab daily for 7 days per Dr [facility]

## 2018-10-11 ENCOUNTER — HOSPITAL ENCOUNTER (OUTPATIENT)
Dept: PHYSICAL THERAPY | Age: 63
Setting detail: THERAPIES SERIES
Discharge: HOME | End: 2018-10-11
Payer: COMMERCIAL

## 2018-10-11 PROCEDURE — G0283 ELEC STIM OTHER THAN WOUND: HCPCS

## 2018-10-11 PROCEDURE — 97530 THERAPEUTIC ACTIVITIES: CPT

## 2018-10-11 PROCEDURE — 97110 THERAPEUTIC EXERCISES: CPT

## 2018-10-11 PROCEDURE — 97112 NEUROMUSCULAR REEDUCATION: CPT

## 2018-10-11 PROCEDURE — 97140 MANUAL THERAPY 1/> REGIONS: CPT

## 2018-10-11 NOTE — FLOWSHEET NOTE
[facility]    Physical Therapy Daily Treatment Note  Date:  10/11/2018    Patient Name:  Vince Christianson    :    MRN: [id number]  Restrictions/Precautions:    Medical/Treatment Diagnosis Information:  · Diagnosis: M17.12 (ICD-10-CM) - Primary osteoarthritis of left knee  · Treatment Diagnosis: Decreased functional mobility ; Decreased ADL status; Decreased ROM; Decreased strength;Decreased endurance;Decreased balance;  · S/P Left TKR  · DOS: 18  · Medications: Percocet before PT visits, Tylenol every 3-4 hours, Coumadin, ; Prednisone, started   Insurance/Certification information:  PT Insurance Information: PT 2018 BENEFITS/ANTHEM/ ACTIVE/ DED 3450 .37/ OOP 6900 .66/ PAYS 100%/ 20 VPCY/ NO AUTH REQ/ NND#[id number]/ CB 18   Physician Information:  Referring Practitioner: Alexa Johnsont of care signed (Y/N): Signed    Date of Patient follow up with Physician: 12 weeks PO  Patient seen in consultation with Dr. Francis Rooney who established the initial/subsequent treatment protocol. 18: surgery went well, minimal swelling, knee looks good  18: saw MD upstairs + x-ray taken; Start medrol dose pack; keep pushing ROM, not concerned at this time   10-2-18: discussion re: meds for pain, will repeat Doppler this week, given medrol dose pack, feels she is improving      G-Code (if applicable):      Date G-Code Applied: 18       Progress Note: [x]  Yes  []  No  Next due by: Visit #20       Latex Allergy:  [x]NO      []YES  Preferred Language for Healthcare:   [x]English       []other:    Visit # Insurance Allowable   14 20 VPCY     Pain level:3/10    SUBJECTIVE:  Patient presents to PT 6+ weeks PO from Left TKR . Doing well today. Presents to PT without any AD with mild antalgic gait. Reports she was very active yesterday so she is a little sore/swollen.  Also reports she has to stay on coumadin x 3 months with weekly blood draws. OUTCOME MEASURE DATE DEFICIT   LEFS 8/23/18 (pre-op) 79% Deficit   LEFS 8/30/18 PO 90% Deficit   LEFS 9/27/18 66% Deficit       OBJECTIVE:       10/11/18  ROM PROM AROM Overpressure Comment     L R L R L R     Flexion   120   100 128     Cold  ERMI   Extension 0    +4                                                8/30/18  Strength L R Comment   Quad 3/5 5/5 QS   Hamstring 4+ 4+     Gastroc         Hip  flexion 4+ 4+     Hip abd 5 5 seated                          9/5/18  Special Test Results/Comment   Homans Positive, ultrasound ordered   9/7/18: patient found to have positive US for DVT, currently on coumadin for treatment       10/2/18  Girth L R   Mid Patella 39.6 36.3   Suprapatellar 39.7 36.5   5cm above 39.2 37.6   15cm above             TEST INITIAL  8/23/18   GOAL   SINGLE LEG STANCE TIME L: >20\"     R: >20\"   >20 SECONDS   TIMED UP And GO 6.87\"   61-77 y/o: <9sec  66-76 y/o: <10sec  80-79 y/o: <12 sec         Reflexes/Sensation:               [x]Dermatomes/Myotomes intact               [x]Reflexes equal and normal bilaterally              []Other:     Joint mobility:                [x]Normal               []Hypo              []Hyper     Palpation: General PO tenderness     Functional Mobility/Transfers: independent     Posture:  WNL     Bandages/Dressings/Incisions:  Incision healing well, no signs of infection 9/25/18     Gait: (include devices/WB status) Near equal WB using FWW with slight decrease in knee flexion 9/25/18     Orthopedic Special Tests: Leg Length: L 39\" , R 38.5\"; 3/8th inch heel lift provided on 9/11/18       RESTRICTIONS/PRECAUTIONS: Bilateral Knee OA    Exercises/Interventions:  Exercise/Equipment Resistance/Repetitions Other comments   Stretching       Hamstring 5 x 30\" Prop   Hip Flexor     ITB     Figure 4     Quad 5 x 30\" Towel under thigh   Inclined Calf 5 x 30\"               ROM          Sheet Pulls     Recumbent Bike 10' AAROM, seat 11   ERMI 5 x 1' + 1'

## 2018-10-16 ENCOUNTER — APPOINTMENT (OUTPATIENT)
Dept: PHYSICAL THERAPY | Age: 63
End: 2018-10-16
Payer: COMMERCIAL

## 2018-10-16 ENCOUNTER — HOSPITAL ENCOUNTER (OUTPATIENT)
Dept: PHYSICAL THERAPY | Age: 63
Discharge: HOME | End: 2018-10-16
Payer: COMMERCIAL

## 2018-10-16 LAB
INR BLD: 1.1 (ref 0.8–1.2)
PROTHROMBIN TIME: 14.2 SECONDS (ref 11.7–14.2)

## 2018-10-18 ENCOUNTER — APPOINTMENT (OUTPATIENT)
Dept: PHYSICAL THERAPY | Age: 63
End: 2018-10-18
Payer: COMMERCIAL

## 2018-10-18 ENCOUNTER — HOSPITAL ENCOUNTER (OUTPATIENT)
Dept: PHYSICAL THERAPY | Age: 63
Discharge: HOME | End: 2018-10-18
Payer: COMMERCIAL

## 2018-10-23 ENCOUNTER — HOSPITAL ENCOUNTER (OUTPATIENT)
Dept: PHYSICAL THERAPY | Age: 63
Discharge: HOME | End: 2018-10-23
Payer: COMMERCIAL

## 2018-10-23 ENCOUNTER — APPOINTMENT (OUTPATIENT)
Dept: PHYSICAL THERAPY | Age: 63
End: 2018-10-23
Payer: COMMERCIAL

## 2018-10-23 LAB
INR BLD: 1.2 (ref 0.8–1.2)
PROTHROMBIN TIME: 15.8 SECONDS (ref 11.7–14.2)

## 2018-10-25 ENCOUNTER — HOSPITAL ENCOUNTER (OUTPATIENT)
Dept: PHYSICAL THERAPY | Age: 63
Discharge: HOME | End: 2018-10-25
Payer: COMMERCIAL

## 2018-10-25 ENCOUNTER — APPOINTMENT (OUTPATIENT)
Dept: PHYSICAL THERAPY | Age: 63
End: 2018-10-25
Payer: COMMERCIAL

## 2018-10-30 ENCOUNTER — HOSPITAL ENCOUNTER (OUTPATIENT)
Dept: PHYSICAL THERAPY | Age: 63
Discharge: HOME | End: 2018-10-30
Payer: COMMERCIAL

## 2018-10-30 ENCOUNTER — APPOINTMENT (OUTPATIENT)
Dept: PHYSICAL THERAPY | Age: 63
End: 2018-10-30
Payer: COMMERCIAL

## 2018-10-30 LAB
INR BLD: 1.5 (ref 0.8–1.2)
PROTHROMBIN TIME: 18.7 SECONDS (ref 11.7–14.2)

## 2018-10-30 NOTE — FLOWSHEET NOTE
[facility] [address], [address]  Phone: [phone]  Fax [phone]      Lower Extremity Daily Performance Training Note  Date:  10/30/2018    Patient Name:  Louisa Apodaca    :    MRN: [id number]  Restrictions/Precautions:   Medical/Treatment Diagnosis Information:   ·  S/P L TKR  ·  DOS:   Insurance/Certification information:       Physician Information:   Referring Physician: Chana Mariee      Pain level: 0/10 @ rest    4/10 @ worst (night)     Visit Number: 5    Subjective: Pt states medial knee is still sore with occasional burning.   Does still have difficultly finding a position of comfort at night, but also noticing a \"few days here and there where knee feels normal.\"    Objective:    10/30/18            ROM PROM AROM Overpressure Comment     L R L R L R     Flexion    126   100 128     Cold  ERMI   Extension 0     +4                                                18  Strength L R Comment   Quad 3/5 5/5 QS   Hamstring 4+ 4+     Gastroc         Hip  flexion 4+ 4+     Hip abd 5 5 Seated                          10/2/18  Girth L R   Mid Patella 39.6 36.3   Suprapatellar 39.7 36.5   5cm above 39.2 37.6   15cm above              RESTRICTIONS/PRECAUTIONS: Bilateral Knee OA        Positive US for DVT 18- currently on coumadin     Exercises/Interventions:  Exercise/Equipment Resistance/Repetitions Other comments   Stretching       Hamstring 5 x 30\" Prop   Hip Flexor       ITB       Figure 4       Quad 5 x 30\" Towel under thigh   Inclined Calf 5 x 30\"     Towel Pull     Runner Stretch              ROM       EOB Self-Assist      Active     Hang Weights     Sheet Pulls       Recumbent Bike 10' AAROM, seat 9   Biodex     ERMI 5 x 1' + 1'     Ankle Pumps             Patellar Glides       Medial 3'     Superior 3'     Inferior 3'             Isometrics       Quad sets      Add sets              SLR       Supine   HEP

## 2018-11-01 ENCOUNTER — HOSPITAL ENCOUNTER (OUTPATIENT)
Dept: PHYSICAL THERAPY | Age: 63
Setting detail: THERAPIES SERIES
Discharge: HOME | End: 2018-11-01
Payer: COMMERCIAL

## 2018-11-01 PROCEDURE — G8978 MOBILITY CURRENT STATUS: HCPCS

## 2018-11-01 PROCEDURE — 97110 THERAPEUTIC EXERCISES: CPT

## 2018-11-01 PROCEDURE — 97112 NEUROMUSCULAR REEDUCATION: CPT

## 2018-11-01 PROCEDURE — G8979 MOBILITY GOAL STATUS: HCPCS

## 2018-11-01 PROCEDURE — 97530 THERAPEUTIC ACTIVITIES: CPT

## 2018-11-01 NOTE — PLAN OF CARE
Treatment Note  Date:  2018    Patient Name:  Cong Love    :    MRN: [id number]  Restrictions/Precautions:    Medical/Treatment Diagnosis Information:  · Diagnosis: M17.12 (ICD-10-CM) - Primary osteoarthritis of left knee  · Treatment Diagnosis: Decreased functional mobility ; Decreased ADL status; Decreased ROM; Decreased strength;Decreased endurance;Decreased balance;  · S/P Left TKR  · DOS: 18  · Medications:  Tylenol every 4 hours, Coumadin, ;   Insurance/Certification information:  PT Insurance Information: PT 2018 BENEFITS/ANTHEM/ ACTIVE/ DED 3450 .37/ OOP 6900 .66/ PAYS 100%/ 20 VPCY/ NO AUTH REQ/ DLZ#[id number]/ CB 18   Physician Information:  Referring Practitioner: Samantha Munguia of care signed (Y/N): Signed    Date of Patient follow up with Physician: 12 weeks PO  Patient seen in consultation with Dr. Cortney Aguirre who established the initial/subsequent treatment protocol. 18: surgery went well, minimal swelling, knee looks good  18: saw MD upstairs + x-ray taken; Start medrol dose pack; keep pushing ROM, not concerned at this time   10-2-18: discussion re: meds for pain, will repeat Doppler this week, given medrol dose pack, feels she is improving      G-Code (if applicable):      Date G-Code Applied: 18  PT G-Codes  Functional Assessment Tool Used: LEFS  Score: 30% Deficit  Functional Limitation: Mobility: Walking and moving around  Mobility: Walking and Moving Around Current Status (): At least 20 percent but less than 40 percent impaired, limited or restricted  Mobility: Walking and Moving Around Goal Status ():  At least 1 percent but less than 20 percent impaired, limited or restricted    Progress Note: [x]  Yes  []  No  Next due by: Visit #25       Latex Allergy:  [x]NO      []YES  Preferred Language for Healthcare:   [x]English       []other:    Visit # Insurance Allowable   15 20 VPCY     Pain level:0/10 at rest, 4/10 at worst (at night quad stretch prior to all knee flexion ROM OP sets at home  11/1/18: instructed patient to ice cup over pes anserine and/or other sensitive areas 1-3x/day specifically before bed to try and alleviate pain at night    HEP:  Patient instructed on HEP on this date with handout provided and all questions answered. Patient was instructed to contact PT with any complications or questions about HEP moving forward. Patient verbally stated she/he understood. Updated 11/1/18    Therapeutic Exercise and NMR EXR  [x] (83119) Provided verbal/tactile cueing for activities related to strengthening, flexibility, endurance, ROM for improvements in LE, proximal hip, and core control with self care, mobility, lifting, ambulation.  [] (58399) Provided verbal/tactile cueing for activities related to improving balance, coordination, kinesthetic sense, posture, motor skill, proprioception  to assist with LE, proximal hip, and core control in self care, mobility, lifting, ambulation and eccentric single leg control.      NMR and Therapeutic Activities:    [x] (36906 or 83615) Provided verbal/tactile cueing for activities related to improving balance, coordination, kinesthetic sense, posture, motor skill, proprioception and motor activation to allow for proper function of core, proximal hip and LE with self care and ADLs  [] (91771) Gait Re-education- Provided training and instruction to the patient for proper LE, core and proximal hip recruitment and positioning and eccentric body weight control with ambulation re-education including up and down stairs     Home Exercise Program:    [x] (10506) Reviewed/Progressed HEP activities related to strengthening, flexibility, endurance, ROM of core, proximal hip and LE for functional self-care, mobility, lifting and ambulation/stair navigation   [] (55461)Reviewed/Progressed HEP activities related to improving balance, coordination, kinesthetic sense, posture, motor skill, proprioception of core,

## 2018-11-06 ENCOUNTER — OFFICE VISIT (OUTPATIENT)
Dept: ORTHOPEDIC SURGERY | Age: 63
End: 2018-11-06
Payer: COMMERCIAL

## 2018-11-06 ENCOUNTER — HOSPITAL ENCOUNTER (OUTPATIENT)
Dept: PHYSICAL THERAPY | Age: 63
Discharge: HOME | End: 2018-11-06
Payer: COMMERCIAL

## 2018-11-06 VITALS
WEIGHT: 193 LBS | BODY MASS INDEX: 27.02 KG/M2 | SYSTOLIC BLOOD PRESSURE: 132 MMHG | HEIGHT: 71 IN | HEART RATE: 75 BPM | DIASTOLIC BLOOD PRESSURE: 84 MMHG

## 2018-11-06 DIAGNOSIS — M17.11 PRIMARY OSTEOARTHRITIS OF RIGHT KNEE: Primary | ICD-10-CM

## 2018-11-06 DIAGNOSIS — M25.561 RIGHT KNEE PAIN, UNSPECIFIED CHRONICITY: ICD-10-CM

## 2018-11-06 LAB
INR BLD: 1.5 (ref 0.8–1.2)
PROTHROMBIN TIME: 18.1 SECONDS (ref 11.7–14.2)

## 2018-11-06 PROCEDURE — 99213 OFFICE O/P EST LOW 20 MIN: CPT | Performed by: ORTHOPAEDIC SURGERY

## 2018-11-06 NOTE — FLOWSHEET NOTE
[facility] [address], [address]  Phone: [phone]  Fax [phone]      Lower Extremity Daily Performance Training Note  Date:  2018    Patient Name:  Dejah Richmond    :    MRN: [id number]  Restrictions/Precautions:   Medical/Treatment Diagnosis Information:   ·  S/P L TKR  ·  DOS: 2/28/10  Insurance/Certification information:       Physician Information:   Referring Physician: Skinny Gray      Pain level: 2/10 @ rest    4/10 @ worst      Visit Number: 6    Subjective: Pt states noticing some increased soreness particularly in her hamstrings, but has been up and on it more. Just saw Dr Skinny Gray and is now tentatively scheduled for right TKR on , will come off coumadin around  or  and have a repeat doppler to check status of blood clot.     Objective:    18            ROM PROM AROM Overpressure Comment     L R L R L R     Flexion    127   100 128     Cold  ERMI   Extension 0     +4                                                18  Strength L R Comment   Quad 3/5 5/5 QS   Hamstring 4+ 4+     Gastroc         Hip  flexion 4+ 4+     Hip abd 5 5 Seated                          10/2/18  Girth L R   Mid Patella 39.6 36.3   Suprapatellar 39.7 36.5   5cm above 39.2 37.6   15cm above              RESTRICTIONS/PRECAUTIONS: Bilateral Knee OA        Positive US for DVT 18- currently on coumadin     Exercises/Interventions:  Exercise/Equipment Resistance/Repetitions Other comments   Stretching       Hamstring 6 x 30\"  2 each- IR, N, ER   Hip Flexor       ITB       Figure 4       Quad 5 x 30\" Towel under thigh   Inclined Calf 5 x 30\"     Towel Pull     Runner Stretch              ROM       EOB Self-Assist      Active     Hang Weights     Sheet Pulls       Recumbent Bike 10' AAROM, seat 9   Biodex     ERMI 5 x 1' + 1'     Ankle Pumps             Patellar Glides       Medial 3'     Superior 3'   Inferior 3'             Isometrics       Quad sets      Add sets              SLR       Supine   HEP   Prone       Abduction   HEP   Adducton       SLR+               Glutes       Bridges       Supine Clams       S/L Clams  3 x 10 Blue   Side Stepping       Monster walks               CKC       Weight Shift     Calf raises 3 x 10 12# DBs   Wall sits 3 x 1'     Step ups 3 x 10 Up & Over 6\"   Squatting   Hold d/t R Knee   CC TKE SL DL               PRE       Extension 3 x 10 RANGE: 90-30, 20# DL   Flexion 3 x 10 RANGE: 0-90, 40# DL   Leg Press 3 x 10 RANGE: 90-10, 100# DL    Cable Column               Balance       Tilt board       Tandem Balance 3 x 30\" each Airex   Biodex balance  4' RC L7, medium [place]           Other       Bike     Treadmill 6' Gait Trainer @ 2.0 mph                Manual Interventions     IASTM  Quad, VMO   Foot/Ankle mobs  Talocrural distraction, metatarsal mobilizations            Other Therapeutic Activities: Discussed knee sleeve fit & usage    Home Exercise Program: See last PT note    Patient Education: See last PT note         Assessment:  [x] Patient tolerated treatment well [x] Patient limited by fatigue  [] Patient limited by pain  [] Patient limited by other medical complications  [x] Other: Pt had good tolerance to session today, changed hamstring stretch to include neutral, internal and external rotation to try and help with tendon tightness. Able to increase hold time with wall sits but was fatigued after.       Prognosis: [x] Good [] Fair  [] Poor    Patient Requires Follow-up: [x] Yes  [] No    Plan:   [x] Continue per plan of care [] Alter current plan (see comments)  [] Plan of care initiated [] Hold pending MD visit [] Discharge    Plan for Next Session: Progress weights on PRE machines as tolerated by patient; progress up & over step to 8\" when technique allows with focus on eccentric quadriceps control; can trial lateral band walks and/or monster walks at [facility] for hip

## 2018-11-06 NOTE — PROGRESS NOTES
[address]n [address] [place]  President and Medical Director  [employer] [facility] [employer]  Professor of [address]

## 2018-11-08 ENCOUNTER — HOSPITAL ENCOUNTER (OUTPATIENT)
Dept: PHYSICAL THERAPY | Age: 63
Discharge: HOME | End: 2018-11-08
Payer: COMMERCIAL

## 2018-11-08 DIAGNOSIS — M17.12 PRIMARY OSTEOARTHRITIS OF LEFT KNEE: Primary | ICD-10-CM

## 2018-11-08 RX ORDER — WARFARIN SODIUM 2.5 MG/1
TABLET ORAL
Qty: 40 TABLET | Refills: 1 | Status: SHIPPED | OUTPATIENT
Start: 2018-11-08

## 2018-11-13 ENCOUNTER — HOSPITAL ENCOUNTER (OUTPATIENT)
Dept: GENERAL RADIOLOGY | Age: 63
Discharge: HOME | End: 2018-11-13
Payer: COMMERCIAL

## 2018-11-13 ENCOUNTER — HOSPITAL ENCOUNTER (OUTPATIENT)
Age: 63
Discharge: HOME | End: 2018-11-13
Payer: COMMERCIAL

## 2018-11-13 ENCOUNTER — HOSPITAL ENCOUNTER (OUTPATIENT)
Dept: PHYSICAL THERAPY | Age: 63
Discharge: HOME | End: 2018-11-13
Payer: COMMERCIAL

## 2018-11-13 DIAGNOSIS — M17.11 ARTHRITIS OF KNEE, RIGHT: ICD-10-CM

## 2018-11-13 LAB
INR BLD: 1.4 (ref 0.8–1.2)
PROTHROMBIN TIME: 17.3 SECONDS (ref 11.7–14.2)

## 2018-11-13 PROCEDURE — 77073 BONE LENGTH STUDIES: CPT

## 2018-11-14 DIAGNOSIS — M17.11 PRIMARY OSTEOARTHRITIS OF RIGHT KNEE: Primary | ICD-10-CM

## 2018-11-15 ENCOUNTER — APPOINTMENT (OUTPATIENT)
Dept: PHYSICAL THERAPY | Age: 63
End: 2018-11-15
Payer: COMMERCIAL

## 2018-11-16 ENCOUNTER — HOSPITAL ENCOUNTER (OUTPATIENT)
Dept: PHYSICAL THERAPY | Age: 63
Discharge: HOME | End: 2018-11-16

## 2018-11-20 ENCOUNTER — HOSPITAL ENCOUNTER (OUTPATIENT)
Dept: PHYSICAL THERAPY | Age: 63
Setting detail: THERAPIES SERIES
Discharge: HOME | End: 2018-11-20
Payer: COMMERCIAL

## 2018-11-20 LAB
INR BLD: 1.3 (ref 0.8–1.2)
PROTHROMBIN TIME: 16 SECONDS (ref 11.7–14.2)

## 2018-11-20 NOTE — FLOWSHEET NOTE
[facility] [address], [address]  Phone: [phone]  Fax [phone]      Lower Extremity Daily Performance Training Note  Date:  2018    Patient Name:  Gonsalo Anderson    :  [birth year]  MRN: [id number]  Restrictions/Precautions:   Medical/Treatment Diagnosis Information:   ·  S/P L TKR  ·  DOS:   Insurance/Certification information:       Physician Information:   Referring Physician: Ulysses Carmine      Pain level: 0/10 @ rest    4/10 @ worst      Visit Number: 9    Subjective: Pt states that she feels stiff today from having to sit quite a bit. No increase in pain or discomfort.      Objective:    18            ROM PROM AROM Overpressure Comment     L R L R L R     Flexion    126   100 128     Cold  ERMI   Extension 0     +4                                                18  Strength L R Comment   Quad 3/5 5/5 QS   Hamstring 4+ 4+     Gastroc         Hip  flexion 4+ 4+     Hip abd 5 5 Seated                          10/2/18  Girth L R   Mid Patella 39.6 36.3   Suprapatellar 39.7 36.5   5cm above 39.2 37.6   15cm above              RESTRICTIONS/PRECAUTIONS: Bilateral Knee OA        Positive US for DVT 18- currently on coumadin     Exercises/Interventions:  Exercise/Equipment Resistance/Repetitions Other comments   Stretching       Hamstring 6 x 30\"  2 each- IR, N, ER   Hip Flexor       ITB       Figure 4       Quad 5 x 30\" Towel under thigh   Inclined Calf 5 x 30\"     Towel Pull     Runner Stretch              ROM       EOB Self-Assist      Active     Hang Weights     Sheet Pulls       Recumbent Bike 10' AAROM, seat 9- progresses to seat 7   Biodex     ERMI 5 x 1' + 1'     Ankle Pumps             Patellar Glides       Medial 3'     Superior 3'     Inferior 3'             Isometrics       Quad sets      Add sets              SLR       Supine   HEP   Prone       Abduction   HEP   Adducton       SLR+             Mary Tobin, PT, DPT

## 2018-11-27 ENCOUNTER — HOSPITAL ENCOUNTER (OUTPATIENT)
Dept: PREADMISSION TESTING | Age: 63
Discharge: HOME | End: 2018-12-01
Payer: COMMERCIAL

## 2018-11-27 ENCOUNTER — APPOINTMENT (OUTPATIENT)
Dept: PHYSICAL THERAPY | Age: 63
End: 2018-11-27
Payer: COMMERCIAL

## 2018-11-27 ENCOUNTER — HOSPITAL ENCOUNTER (OUTPATIENT)
Dept: PHYSICAL THERAPY | Age: 63
Setting detail: THERAPIES SERIES
Discharge: HOME | End: 2018-11-27
Payer: COMMERCIAL

## 2018-11-27 VITALS
HEART RATE: 73 BPM | TEMPERATURE: 98.4 F | BODY MASS INDEX: 27.44 KG/M2 | HEIGHT: 71 IN | SYSTOLIC BLOOD PRESSURE: 131 MMHG | DIASTOLIC BLOOD PRESSURE: 80 MMHG | RESPIRATION RATE: 14 BRPM | OXYGEN SATURATION: 99 % | WEIGHT: 196 LBS

## 2018-11-27 LAB
ALBUMIN SERPL-MCNC: 4.3 G/DL (ref 3.4–5)
ALP BLD-CCNC: 127 U/L (ref 40–129)
ALT SERPL-CCNC: 13 U/L (ref 10–40)
ANION GAP SERPL CALCULATED.3IONS-SCNC: 12 MMOL/L (ref 3–16)
APTT: 37.6 SEC (ref 26–36)
AST SERPL-CCNC: 16 U/L (ref 15–37)
BILIRUB SERPL-MCNC: 0.3 MG/DL (ref 0–1)
BILIRUBIN DIRECT: <0.2 MG/DL (ref 0–0.3)
BILIRUBIN, INDIRECT: NORMAL MG/DL (ref 0–1)
BUN BLDV-MCNC: 15 MG/DL (ref 7–20)
CALCIUM SERPL-MCNC: 9.4 MG/DL (ref 8.3–10.6)
CHLORIDE BLD-SCNC: 106 MMOL/L (ref 99–110)
CO2: 25 MMOL/L (ref 21–32)
CREAT SERPL-MCNC: 0.6 MG/DL (ref 0.6–1.2)
EKG ATRIAL RATE: 70 BPM
EKG DIAGNOSIS: NORMAL
EKG P AXIS: 34 DEGREES
EKG P-R INTERVAL: 140 MS
EKG Q-T INTERVAL: 422 MS
EKG QRS DURATION: 80 MS
EKG QTC CALCULATION (BAZETT): 455 MS
EKG R AXIS: 32 DEGREES
EKG T AXIS: 43 DEGREES
EKG VENTRICULAR RATE: 70 BPM
GFR AFRICAN AMERICAN: >60
GFR NON-AFRICAN AMERICAN: >60
GLUCOSE BLD-MCNC: 98 MG/DL (ref 70–99)
HCT VFR BLD CALC: 37.4 % (ref 36–48)
HEMOGLOBIN: 12.1 G/DL (ref 12–16)
INR BLD: 1.65 (ref 0.86–1.14)
MCH RBC QN AUTO: 24.8 PG (ref 26–34)
MCHC RBC AUTO-ENTMCNC: 32.3 G/DL (ref 31–36)
MCV RBC AUTO: 77 FL (ref 80–100)
PDW BLD-RTO: 15.3 % (ref 12.4–15.4)
PLATELET # BLD: 325 K/UL (ref 135–450)
PMV BLD AUTO: 7.2 FL (ref 5–10.5)
POTASSIUM SERPL-SCNC: 4.9 MMOL/L (ref 3.5–5.1)
PROTHROMBIN TIME: 18.8 SEC (ref 9.8–13)
RBC # BLD: 4.86 M/UL (ref 4–5.2)
SODIUM BLD-SCNC: 143 MMOL/L (ref 136–145)
TOTAL PROTEIN: 6.6 G/DL (ref 6.4–8.2)
WBC # BLD: 7.2 K/UL (ref 4–11)

## 2018-11-27 PROCEDURE — 87641 MR-STAPH DNA AMP PROBE: CPT

## 2018-11-27 PROCEDURE — 85027 COMPLETE CBC AUTOMATED: CPT

## 2018-11-27 PROCEDURE — 80076 HEPATIC FUNCTION PANEL: CPT

## 2018-11-27 PROCEDURE — 80048 BASIC METABOLIC PNL TOTAL CA: CPT

## 2018-11-27 PROCEDURE — 93005 ELECTROCARDIOGRAM TRACING: CPT | Performed by: ORTHOPAEDIC SURGERY

## 2018-11-27 PROCEDURE — 85610 PROTHROMBIN TIME: CPT

## 2018-11-27 PROCEDURE — 85730 THROMBOPLASTIN TIME PARTIAL: CPT

## 2018-11-27 PROCEDURE — 93010 ELECTROCARDIOGRAM REPORT: CPT | Performed by: INTERNAL MEDICINE

## 2018-11-27 RX ORDER — CEFAZOLIN SODIUM 2 G/50ML
2 SOLUTION INTRAVENOUS ONCE
Status: CANCELLED | OUTPATIENT
Start: 2018-11-27 | End: 2018-11-27

## 2018-11-27 NOTE — PROGRESS NOTES
effect during this procedure. I give my doctor permission to give me blood or blood products. I understand that there are risks with receiving blood such as hepatitis, AIDS, fever, or allergic reaction. I acknowledge that the risks, benefits, and alternatives of this treatment have been explained to me and that no express or implied warranty has been given by the hospital, any blood bank, or any person or entity as to the blood or blood components transfused. At the discretion of my doctor, I agree to allow observers, equipment/product representatives and allow photographing, and/or televising of the procedure, provided my name or identity is maintained confidentially. I agree the hospital may dispose of or use for scientific or educational purposes any tissue, fluid, or body parts which may be removed.     ________________________________Date________Time______ am/pm  ([place])  Patient or Signature of Closest Relative or Legal Guardian    ________________________________Date________Time______am/pm      Page 1 of  1  Witness
hospital room. 12. If you have a Living Will or Durable Power of , please bring a copy on the day of your procedure. 15.  With your permission, one family member may accompany you while you are being prepared for surgery. Once you are ready, additional family members may join you. HOW WE KEEP YOU SAFE and WORK TO PREVENT SURGICAL SITE INFECTIONS:  1. Health care workers should always check your ID bracelet to verify your name and birth date. You will be asked many times to state your name, date of birth, and allergies. 2. Health care workers should always clean their hands with soap or alcohol gel before providing care to you. It is okay to ask anyone if they cleaned their hands before they touch you. 3. You will be actively involved in verifying the type of procedure you are having and ensuring the correct surgical site. This will be confirmed multiple times prior to your procedure. Do NOT nina your surgery site UNLESS instructed to by your surgeon. 4. Do not shave or wax for 72 hours prior to procedure near your operative site. Shaving with a razor can irritate your skin and make it easier to develop an infection. On the day of your procedure, any hair that needs to be removed near the surgical site will be clipped by a healthcare worker using a special clippers designed to avoid skin irritation. 5. When you are in the operating room, your surgical site will be cleansed with a special soap, and in most cases, you will be given an antibiotic before the surgery begins. What to expect AFTER YOUR PROCEDURE:  1. Immediately following your procedure, your will be taken to the PACU for the first phase of your recovery. Your nurse will help you recover from any potential side effects of anesthesia, such as extreme drowsiness, changes in your vital signs or breathing patterns. Nausea, headache, muscle aches, or sore throat may also occur after anesthesia.   Your nurse will help you

## 2018-11-28 LAB — MRSA SCREEN RT-PCR: NORMAL

## 2018-11-29 ENCOUNTER — APPOINTMENT (OUTPATIENT)
Dept: PHYSICAL THERAPY | Age: 63
End: 2018-11-29
Payer: COMMERCIAL

## 2018-12-03 ENCOUNTER — TELEPHONE (OUTPATIENT)
Dept: ORTHOPEDIC SURGERY | Age: 63
End: 2018-12-03

## 2018-12-18 ENCOUNTER — ANESTHESIA EVENT (OUTPATIENT)
Dept: OPERATING ROOM | Age: 63
End: 2018-12-18
Payer: COMMERCIAL

## 2018-12-18 ENCOUNTER — OFFICE VISIT (OUTPATIENT)
Dept: ORTHOPEDIC SURGERY | Age: 63
End: 2018-12-18

## 2018-12-18 VITALS
BODY MASS INDEX: 27.44 KG/M2 | SYSTOLIC BLOOD PRESSURE: 127 MMHG | HEIGHT: 71 IN | WEIGHT: 196 LBS | DIASTOLIC BLOOD PRESSURE: 80 MMHG | HEART RATE: 75 BPM

## 2018-12-18 DIAGNOSIS — M25.561 RIGHT KNEE PAIN, UNSPECIFIED CHRONICITY: ICD-10-CM

## 2018-12-18 DIAGNOSIS — M17.11 PRIMARY OSTEOARTHRITIS OF RIGHT KNEE: Primary | ICD-10-CM

## 2018-12-18 PROCEDURE — 99024 POSTOP FOLLOW-UP VISIT: CPT | Performed by: ORTHOPAEDIC SURGERY

## 2018-12-18 PROCEDURE — MISCCOLD COLD THERAPY UNIT AND PAD: Performed by: ORTHOPAEDIC SURGERY

## 2018-12-18 PROCEDURE — MISC250 COMPRESSION STOCKING: Performed by: ORTHOPAEDIC SURGERY

## 2018-12-18 RX ORDER — WARFARIN SODIUM 2.5 MG/1
TABLET ORAL
Qty: 40 TABLET | Refills: 1 | Status: ON HOLD | OUTPATIENT
Start: 2018-12-18 | End: 2018-12-20 | Stop reason: HOSPADM

## 2018-12-18 RX ORDER — OXYCODONE HYDROCHLORIDE AND ACETAMINOPHEN 5; 325 MG/1; MG/1
1 TABLET ORAL EVERY 6 HOURS PRN
Qty: 28 TABLET | Refills: 0 | Status: ON HOLD | OUTPATIENT
Start: 2018-12-18 | End: 2018-12-20

## 2018-12-18 NOTE — PROGRESS NOTES
5-325 MG per tablet Take 1 tablet by mouth every 6 hours as needed for Pain for up to 7 days. Intended supply: 7 days. Take lowest dose possible to manage pain. 28 tablet 0    warfarin (COUMADIN) 2.5 MG tablet 2 tabs daily (Patient taking differently: 5 mg every evening 2 tabs daily) 40 tablet 1    acetaminophen (TYLENOL) 500 MG tablet Take 1,000 mg by mouth every 6 hours as needed for Pain      Cetirizine HCl (ZYRTEC ALLERGY PO) Take 10 mg by mouth as needed Seasonal       No current facility-administered medications for this visit. No Known Allergies    Review of Systems:  A 14 point review of systems was completed by the patient and is available in the media section of the scanned medical record and was reviewed on 12/18/2018. The review is negative with the exception of those things mentioned in the HPI and Past Medical History     Vital Signs:   /80   Pulse 75   Ht 5' 10.5\" (1.791 m)   Wt 196 lb (88.9 kg)   BMI 27.73 kg/m²     General Exam:   Mental Status: The patient is oriented to time, place and person. The patient's mood and affect are appropriate. Neurological: The patient has good coordination. There is no weakness or sensory deficit. Knee Examination:right    Skin:  There are no skin lesions, cellulitis, or extreme edema in the lower extremities. Sensation is grossly intact to light touch bilaterally lower extremity. The patient has warm and well-perfused Bilateral lower extremities with brisk capillary refill. Inspection:  No effusion,  no gross deformities, no signs of infection. Palpation: There is patellofemoral crepitus, there is joint line tenderness    Range of Motion:  2-120    Strength:   Motor is grossly intact    Special Tests:  No ligament instability     Gait: Non antalgic    Alignment: Varus deformity    Radiology:     X-rays obtained and reviewed in office: none  COMPRESSION STOCKING  COLD THERAPY UNIT AND PAD      Office Procedures:  Orders Placed This

## 2018-12-19 ENCOUNTER — HOSPITAL ENCOUNTER (OUTPATIENT)
Age: 63
Setting detail: OBSERVATION
LOS: 1 days | Discharge: HOME | End: 2018-12-20
Attending: ORTHOPAEDIC SURGERY | Admitting: ORTHOPAEDIC SURGERY
Payer: COMMERCIAL

## 2018-12-19 ENCOUNTER — ANESTHESIA (OUTPATIENT)
Dept: OPERATING ROOM | Age: 63
End: 2018-12-19
Payer: COMMERCIAL

## 2018-12-19 VITALS — SYSTOLIC BLOOD PRESSURE: 140 MMHG | OXYGEN SATURATION: 94 % | DIASTOLIC BLOOD PRESSURE: 70 MMHG | TEMPERATURE: 97 F

## 2018-12-19 DIAGNOSIS — M17.11 PRIMARY OSTEOARTHRITIS OF RIGHT KNEE: ICD-10-CM

## 2018-12-19 LAB
INR BLD: 0.94 (ref 0.86–1.14)
INR BLD: 1.09 (ref 0.86–1.14)
PROTHROMBIN TIME: 10.7 SEC (ref 9.8–13)
PROTHROMBIN TIME: 12.4 SEC (ref 9.8–13)

## 2018-12-19 PROCEDURE — G0378 HOSPITAL OBSERVATION PER HR: HCPCS

## 2018-12-19 PROCEDURE — 2580000003 HC RX 258: Performed by: ORTHOPAEDIC SURGERY

## 2018-12-19 PROCEDURE — C1713 ANCHOR/SCREW BN/BN,TIS/BN: HCPCS | Performed by: ORTHOPAEDIC SURGERY

## 2018-12-19 PROCEDURE — 7100000001 HC PACU RECOVERY - ADDTL 15 MIN: Performed by: ORTHOPAEDIC SURGERY

## 2018-12-19 PROCEDURE — 2500000003 HC RX 250 WO HCPCS: Performed by: ORTHOPAEDIC SURGERY

## 2018-12-19 PROCEDURE — 6360000002 HC RX W HCPCS: Performed by: ORTHOPAEDIC SURGERY

## 2018-12-19 PROCEDURE — 2580000003 HC RX 258: Performed by: ANESTHESIOLOGY

## 2018-12-19 PROCEDURE — 3700000001 HC ADD 15 MINUTES (ANESTHESIA): Performed by: ORTHOPAEDIC SURGERY

## 2018-12-19 PROCEDURE — 36415 COLL VENOUS BLD VENIPUNCTURE: CPT

## 2018-12-19 PROCEDURE — 3700000000 HC ANESTHESIA ATTENDED CARE: Performed by: ORTHOPAEDIC SURGERY

## 2018-12-19 PROCEDURE — 6370000000 HC RX 637 (ALT 250 FOR IP)

## 2018-12-19 PROCEDURE — 7100000000 HC PACU RECOVERY - FIRST 15 MIN: Performed by: ORTHOPAEDIC SURGERY

## 2018-12-19 PROCEDURE — 2709999900 HC NON-CHARGEABLE SUPPLY: Performed by: ORTHOPAEDIC SURGERY

## 2018-12-19 PROCEDURE — 94150 VITAL CAPACITY TEST: CPT

## 2018-12-19 PROCEDURE — 3600000014 HC SURGERY LEVEL 4 ADDTL 15MIN: Performed by: ORTHOPAEDIC SURGERY

## 2018-12-19 PROCEDURE — 6360000002 HC RX W HCPCS

## 2018-12-19 PROCEDURE — 2720000010 HC SURG SUPPLY STERILE: Performed by: ORTHOPAEDIC SURGERY

## 2018-12-19 PROCEDURE — C1776 JOINT DEVICE (IMPLANTABLE): HCPCS | Performed by: ORTHOPAEDIC SURGERY

## 2018-12-19 PROCEDURE — 96372 THER/PROPH/DIAG INJ SC/IM: CPT

## 2018-12-19 PROCEDURE — 6370000000 HC RX 637 (ALT 250 FOR IP): Performed by: ORTHOPAEDIC SURGERY

## 2018-12-19 PROCEDURE — 94761 N-INVAS EAR/PLS OXIMETRY MLT: CPT

## 2018-12-19 PROCEDURE — 3600000004 HC SURGERY LEVEL 4 BASE: Performed by: ORTHOPAEDIC SURGERY

## 2018-12-19 PROCEDURE — 6360000002 HC RX W HCPCS: Performed by: ANESTHESIOLOGY

## 2018-12-19 PROCEDURE — 85610 PROTHROMBIN TIME: CPT

## 2018-12-19 PROCEDURE — 2500000003 HC RX 250 WO HCPCS

## 2018-12-19 DEVICE — COMPONENT TOT KNEE CAPPED ADV OXIN NP JOURNEY II: Type: IMPLANTABLE DEVICE | Site: KNEE | Status: FUNCTIONAL

## 2018-12-19 DEVICE — CEMENT BNE 20ML 41GM FULL DOSE PMMA W/ TOBRA M VISC RADPQ: Type: IMPLANTABLE DEVICE | Site: KNEE | Status: FUNCTIONAL

## 2018-12-19 DEVICE — JOURNEY II BCS FEMORAL OXINIUM                                    RIGHT SIZE 4
Type: IMPLANTABLE DEVICE | Site: KNEE | Status: FUNCTIONAL
Brand: JOURNEY

## 2018-12-19 DEVICE — JOURNEY 7.5 ROUND RESURF PAT 32MM STANDARD
Type: IMPLANTABLE DEVICE | Site: KNEE | Status: FUNCTIONAL
Brand: JOURNEY

## 2018-12-19 DEVICE — JOURNEY TIBIAL BASEPLATE NONPOROUS                                    RIGHT SIZE 4
Type: IMPLANTABLE DEVICE | Site: KNEE | Status: FUNCTIONAL
Brand: JOURNEY

## 2018-12-19 DEVICE — JOURNEY II BCS XLPE ARTICULAR                                    INSERT SIZE 3-4 RIGHT 9MM
Type: IMPLANTABLE DEVICE | Site: KNEE | Status: FUNCTIONAL
Brand: JOURNEY

## 2018-12-19 RX ORDER — SCOLOPAMINE TRANSDERMAL SYSTEM 1 MG/1
PATCH, EXTENDED RELEASE TRANSDERMAL PRN
Status: DISCONTINUED | OUTPATIENT
Start: 2018-12-19 | End: 2018-12-19 | Stop reason: SDUPTHER

## 2018-12-19 RX ORDER — CETIRIZINE HYDROCHLORIDE 10 MG/1
10 TABLET ORAL DAILY PRN
Status: DISCONTINUED | OUTPATIENT
Start: 2018-12-19 | End: 2018-12-20 | Stop reason: HOSPADM

## 2018-12-19 RX ORDER — OXYCODONE HYDROCHLORIDE 5 MG/1
10 TABLET ORAL EVERY 4 HOURS PRN
Status: DISCONTINUED | OUTPATIENT
Start: 2018-12-19 | End: 2018-12-20 | Stop reason: HOSPADM

## 2018-12-19 RX ORDER — HYDROMORPHONE HCL 110MG/55ML
PATIENT CONTROLLED ANALGESIA SYRINGE INTRAVENOUS PRN
Status: DISCONTINUED | OUTPATIENT
Start: 2018-12-19 | End: 2018-12-19 | Stop reason: SDUPTHER

## 2018-12-19 RX ORDER — TRANEXAMIC ACID 100 MG/ML
INJECTION, SOLUTION INTRAVENOUS PRN
Status: DISCONTINUED | OUTPATIENT
Start: 2018-12-19 | End: 2018-12-19 | Stop reason: HOSPADM

## 2018-12-19 RX ORDER — DIPHENHYDRAMINE HCL 25 MG
25 TABLET ORAL EVERY 6 HOURS PRN
Status: DISCONTINUED | OUTPATIENT
Start: 2018-12-19 | End: 2018-12-20 | Stop reason: HOSPADM

## 2018-12-19 RX ORDER — METOCLOPRAMIDE HYDROCHLORIDE 5 MG/ML
10 INJECTION INTRAMUSCULAR; INTRAVENOUS
Status: DISCONTINUED | OUTPATIENT
Start: 2018-12-19 | End: 2018-12-19 | Stop reason: HOSPADM

## 2018-12-19 RX ORDER — FENTANYL CITRATE 50 UG/ML
INJECTION, SOLUTION INTRAMUSCULAR; INTRAVENOUS PRN
Status: DISCONTINUED | OUTPATIENT
Start: 2018-12-19 | End: 2018-12-19 | Stop reason: SDUPTHER

## 2018-12-19 RX ORDER — DOCUSATE SODIUM 100 MG/1
100 CAPSULE, LIQUID FILLED ORAL 2 TIMES DAILY
Status: DISCONTINUED | OUTPATIENT
Start: 2018-12-19 | End: 2018-12-20 | Stop reason: HOSPADM

## 2018-12-19 RX ORDER — MEPERIDINE HYDROCHLORIDE 25 MG/ML
12.5 INJECTION INTRAMUSCULAR; INTRAVENOUS; SUBCUTANEOUS EVERY 5 MIN PRN
Status: DISCONTINUED | OUTPATIENT
Start: 2018-12-19 | End: 2018-12-19 | Stop reason: HOSPADM

## 2018-12-19 RX ORDER — METOPROLOL TARTRATE 5 MG/5ML
INJECTION INTRAVENOUS PRN
Status: DISCONTINUED | OUTPATIENT
Start: 2018-12-19 | End: 2018-12-19 | Stop reason: SDUPTHER

## 2018-12-19 RX ORDER — DEXAMETHASONE SODIUM PHOSPHATE 4 MG/ML
10 INJECTION, SOLUTION INTRA-ARTICULAR; INTRALESIONAL; INTRAMUSCULAR; INTRAVENOUS; SOFT TISSUE EVERY 12 HOURS
Status: COMPLETED | OUTPATIENT
Start: 2018-12-19 | End: 2018-12-20

## 2018-12-19 RX ORDER — WARFARIN SODIUM 5 MG/1
5 TABLET ORAL DAILY
Status: DISCONTINUED | OUTPATIENT
Start: 2018-12-19 | End: 2018-12-20 | Stop reason: HOSPADM

## 2018-12-19 RX ORDER — OXYCODONE HYDROCHLORIDE 5 MG/1
5 TABLET ORAL PRN
Status: DISCONTINUED | OUTPATIENT
Start: 2018-12-19 | End: 2018-12-19 | Stop reason: HOSPADM

## 2018-12-19 RX ORDER — OXYCODONE HYDROCHLORIDE 5 MG/1
5 TABLET ORAL EVERY 4 HOURS PRN
Status: DISCONTINUED | OUTPATIENT
Start: 2018-12-19 | End: 2018-12-20 | Stop reason: HOSPADM

## 2018-12-19 RX ORDER — CEFAZOLIN SODIUM 2 G/50ML
2 SOLUTION INTRAVENOUS ONCE
Status: DISCONTINUED | OUTPATIENT
Start: 2018-12-19 | End: 2018-12-19 | Stop reason: SDUPTHER

## 2018-12-19 RX ORDER — CEFAZOLIN SODIUM 2 G/50ML
2 SOLUTION INTRAVENOUS EVERY 8 HOURS
Status: COMPLETED | OUTPATIENT
Start: 2018-12-19 | End: 2018-12-20

## 2018-12-19 RX ORDER — VANCOMYCIN HYDROCHLORIDE 500 MG/10ML
INJECTION, POWDER, LYOPHILIZED, FOR SOLUTION INTRAVENOUS PRN
Status: DISCONTINUED | OUTPATIENT
Start: 2018-12-19 | End: 2018-12-19 | Stop reason: HOSPADM

## 2018-12-19 RX ORDER — GLYCOPYRROLATE 0.2 MG/ML
INJECTION INTRAMUSCULAR; INTRAVENOUS PRN
Status: DISCONTINUED | OUTPATIENT
Start: 2018-12-19 | End: 2018-12-19 | Stop reason: SDUPTHER

## 2018-12-19 RX ORDER — DEXAMETHASONE SODIUM PHOSPHATE 4 MG/ML
6 INJECTION, SOLUTION INTRA-ARTICULAR; INTRALESIONAL; INTRAMUSCULAR; INTRAVENOUS; SOFT TISSUE EVERY 12 HOURS
Status: DISCONTINUED | OUTPATIENT
Start: 2018-12-20 | End: 2018-12-20 | Stop reason: HOSPADM

## 2018-12-19 RX ORDER — LABETALOL HYDROCHLORIDE 5 MG/ML
5 INJECTION, SOLUTION INTRAVENOUS EVERY 10 MIN PRN
Status: DISCONTINUED | OUTPATIENT
Start: 2018-12-19 | End: 2018-12-19 | Stop reason: HOSPADM

## 2018-12-19 RX ORDER — MORPHINE SULFATE 4 MG/ML
1 INJECTION, SOLUTION INTRAMUSCULAR; INTRAVENOUS EVERY 5 MIN PRN
Status: DISCONTINUED | OUTPATIENT
Start: 2018-12-19 | End: 2018-12-19 | Stop reason: HOSPADM

## 2018-12-19 RX ORDER — MAGNESIUM HYDROXIDE 1200 MG/15ML
LIQUID ORAL CONTINUOUS PRN
Status: COMPLETED | OUTPATIENT
Start: 2018-12-19 | End: 2018-12-19

## 2018-12-19 RX ORDER — MIDAZOLAM HYDROCHLORIDE 1 MG/ML
INJECTION INTRAMUSCULAR; INTRAVENOUS PRN
Status: DISCONTINUED | OUTPATIENT
Start: 2018-12-19 | End: 2018-12-19 | Stop reason: SDUPTHER

## 2018-12-19 RX ORDER — DEXAMETHASONE SODIUM PHOSPHATE 4 MG/ML
INJECTION, SOLUTION INTRA-ARTICULAR; INTRALESIONAL; INTRAMUSCULAR; INTRAVENOUS; SOFT TISSUE PRN
Status: DISCONTINUED | OUTPATIENT
Start: 2018-12-19 | End: 2018-12-19 | Stop reason: SDUPTHER

## 2018-12-19 RX ORDER — SODIUM CHLORIDE 9 MG/ML
INJECTION, SOLUTION INTRAVENOUS CONTINUOUS
Status: DISCONTINUED | OUTPATIENT
Start: 2018-12-19 | End: 2018-12-20 | Stop reason: HOSPADM

## 2018-12-19 RX ORDER — ACETAMINOPHEN 325 MG/1
650 TABLET ORAL EVERY 4 HOURS PRN
Status: DISCONTINUED | OUTPATIENT
Start: 2018-12-19 | End: 2018-12-20 | Stop reason: HOSPADM

## 2018-12-19 RX ORDER — ONDANSETRON 2 MG/ML
4 INJECTION INTRAMUSCULAR; INTRAVENOUS EVERY 6 HOURS PRN
Status: DISCONTINUED | OUTPATIENT
Start: 2018-12-19 | End: 2018-12-20 | Stop reason: HOSPADM

## 2018-12-19 RX ORDER — ROCURONIUM BROMIDE 10 MG/ML
INJECTION, SOLUTION INTRAVENOUS PRN
Status: DISCONTINUED | OUTPATIENT
Start: 2018-12-19 | End: 2018-12-19 | Stop reason: SDUPTHER

## 2018-12-19 RX ORDER — SODIUM CHLORIDE, SODIUM LACTATE, POTASSIUM CHLORIDE, CALCIUM CHLORIDE 600; 310; 30; 20 MG/100ML; MG/100ML; MG/100ML; MG/100ML
INJECTION, SOLUTION INTRAVENOUS CONTINUOUS
Status: DISCONTINUED | OUTPATIENT
Start: 2018-12-19 | End: 2018-12-19

## 2018-12-19 RX ORDER — HYDRALAZINE HYDROCHLORIDE 20 MG/ML
5 INJECTION INTRAMUSCULAR; INTRAVENOUS EVERY 10 MIN PRN
Status: DISCONTINUED | OUTPATIENT
Start: 2018-12-19 | End: 2018-12-19 | Stop reason: HOSPADM

## 2018-12-19 RX ORDER — CEFAZOLIN SODIUM 2 G/50ML
2 SOLUTION INTRAVENOUS
Status: COMPLETED | OUTPATIENT
Start: 2018-12-19 | End: 2018-12-19

## 2018-12-19 RX ORDER — SODIUM CHLORIDE 0.9 % (FLUSH) 0.9 %
10 SYRINGE (ML) INJECTION EVERY 12 HOURS SCHEDULED
Status: DISCONTINUED | OUTPATIENT
Start: 2018-12-19 | End: 2018-12-20 | Stop reason: HOSPADM

## 2018-12-19 RX ORDER — ONDANSETRON 2 MG/ML
INJECTION INTRAMUSCULAR; INTRAVENOUS PRN
Status: DISCONTINUED | OUTPATIENT
Start: 2018-12-19 | End: 2018-12-19 | Stop reason: SDUPTHER

## 2018-12-19 RX ORDER — OXYCODONE HYDROCHLORIDE 5 MG/1
10 TABLET ORAL PRN
Status: DISCONTINUED | OUTPATIENT
Start: 2018-12-19 | End: 2018-12-19 | Stop reason: HOSPADM

## 2018-12-19 RX ORDER — PROMETHAZINE HYDROCHLORIDE 25 MG/ML
6.25 INJECTION, SOLUTION INTRAMUSCULAR; INTRAVENOUS
Status: COMPLETED | OUTPATIENT
Start: 2018-12-19 | End: 2018-12-19

## 2018-12-19 RX ORDER — SODIUM CHLORIDE 0.9 % (FLUSH) 0.9 %
10 SYRINGE (ML) INJECTION PRN
Status: DISCONTINUED | OUTPATIENT
Start: 2018-12-19 | End: 2018-12-20 | Stop reason: HOSPADM

## 2018-12-19 RX ORDER — PROPOFOL 10 MG/ML
INJECTION, EMULSION INTRAVENOUS PRN
Status: DISCONTINUED | OUTPATIENT
Start: 2018-12-19 | End: 2018-12-19 | Stop reason: SDUPTHER

## 2018-12-19 RX ORDER — LIDOCAINE HYDROCHLORIDE 20 MG/ML
INJECTION, SOLUTION EPIDURAL; INFILTRATION; INTRACAUDAL; PERINEURAL PRN
Status: DISCONTINUED | OUTPATIENT
Start: 2018-12-19 | End: 2018-12-19 | Stop reason: SDUPTHER

## 2018-12-19 RX ORDER — DIPHENHYDRAMINE HYDROCHLORIDE 50 MG/ML
12.5 INJECTION INTRAMUSCULAR; INTRAVENOUS
Status: DISCONTINUED | OUTPATIENT
Start: 2018-12-19 | End: 2018-12-19 | Stop reason: HOSPADM

## 2018-12-19 RX ADMIN — Medication 10 ML: at 20:49

## 2018-12-19 RX ADMIN — SODIUM CHLORIDE, SODIUM LACTATE, POTASSIUM CHLORIDE, AND CALCIUM CHLORIDE: 600; 310; 30; 20 INJECTION, SOLUTION INTRAVENOUS at 10:10

## 2018-12-19 RX ADMIN — PROMETHAZINE HYDROCHLORIDE 6.25 MG: 25 INJECTION INTRAMUSCULAR; INTRAVENOUS at 12:12

## 2018-12-19 RX ADMIN — HYDROMORPHONE HYDROCHLORIDE 1 MG: 2 INJECTION, SOLUTION INTRAMUSCULAR; INTRAVENOUS; SUBCUTANEOUS at 09:05

## 2018-12-19 RX ADMIN — HYDROMORPHONE HYDROCHLORIDE 0.5 MG: 1 INJECTION, SOLUTION INTRAMUSCULAR; INTRAVENOUS; SUBCUTANEOUS at 11:50

## 2018-12-19 RX ADMIN — DEXAMETHASONE SODIUM PHOSPHATE 10 MG: 4 INJECTION, SOLUTION INTRAMUSCULAR; INTRAVENOUS at 08:50

## 2018-12-19 RX ADMIN — DEXAMETHASONE SODIUM PHOSPHATE 10 MG: 4 INJECTION, SOLUTION INTRAMUSCULAR; INTRAVENOUS at 20:48

## 2018-12-19 RX ADMIN — CEFAZOLIN SODIUM 2 G: 2 SOLUTION INTRAVENOUS at 17:09

## 2018-12-19 RX ADMIN — ROCURONIUM BROMIDE 40 MG: 10 INJECTION, SOLUTION INTRAVENOUS at 08:35

## 2018-12-19 RX ADMIN — PROPOFOL 160 MG: 10 INJECTION, EMULSION INTRAVENOUS at 08:35

## 2018-12-19 RX ADMIN — OXYCODONE HYDROCHLORIDE 10 MG: 5 TABLET ORAL at 20:56

## 2018-12-19 RX ADMIN — DOCUSATE SODIUM 100 MG: 100 CAPSULE, LIQUID FILLED ORAL at 20:49

## 2018-12-19 RX ADMIN — GLYCOPYRROLATE 0.2 MG: 0.2 INJECTION INTRAMUSCULAR; INTRAVENOUS at 08:50

## 2018-12-19 RX ADMIN — WARFARIN SODIUM 5 MG: 5 TABLET ORAL at 20:49

## 2018-12-19 RX ADMIN — OXYCODONE HYDROCHLORIDE 10 MG: 5 TABLET ORAL at 17:09

## 2018-12-19 RX ADMIN — LIDOCAINE HYDROCHLORIDE 100 MG: 20 INJECTION, SOLUTION EPIDURAL; INFILTRATION; INTRACAUDAL; PERINEURAL at 08:35

## 2018-12-19 RX ADMIN — SCOPOLAMINE 1 PATCH: 1 PATCH, EXTENDED RELEASE TRANSDERMAL at 08:30

## 2018-12-19 RX ADMIN — FENTANYL CITRATE 50 MCG: 50 INJECTION INTRAMUSCULAR; INTRAVENOUS at 08:55

## 2018-12-19 RX ADMIN — SODIUM CHLORIDE, SODIUM LACTATE, POTASSIUM CHLORIDE, AND CALCIUM CHLORIDE: 600; 310; 30; 20 INJECTION, SOLUTION INTRAVENOUS at 06:33

## 2018-12-19 RX ADMIN — CEFAZOLIN SODIUM 2 G: 2 SOLUTION INTRAVENOUS at 08:33

## 2018-12-19 RX ADMIN — HYDROMORPHONE HYDROCHLORIDE 0.25 MG: 1 INJECTION, SOLUTION INTRAMUSCULAR; INTRAVENOUS; SUBCUTANEOUS at 23:04

## 2018-12-19 RX ADMIN — HYDROMORPHONE HYDROCHLORIDE 0.5 MG: 1 INJECTION, SOLUTION INTRAMUSCULAR; INTRAVENOUS; SUBCUTANEOUS at 13:59

## 2018-12-19 RX ADMIN — ACETAMINOPHEN 650 MG: 325 TABLET ORAL at 18:44

## 2018-12-19 RX ADMIN — METOPROLOL TARTRATE 2 MG: 5 INJECTION, SOLUTION INTRAVENOUS at 09:15

## 2018-12-19 RX ADMIN — MIDAZOLAM HYDROCHLORIDE 2 MG: 1 INJECTION INTRAMUSCULAR; INTRAVENOUS at 08:30

## 2018-12-19 RX ADMIN — SUGAMMADEX 200 MG: 100 INJECTION, SOLUTION INTRAVENOUS at 10:30

## 2018-12-19 RX ADMIN — HYDROMORPHONE HYDROCHLORIDE 1 MG: 2 INJECTION, SOLUTION INTRAMUSCULAR; INTRAVENOUS; SUBCUTANEOUS at 09:00

## 2018-12-19 RX ADMIN — ENOXAPARIN SODIUM 30 MG: 30 INJECTION SUBCUTANEOUS at 20:48

## 2018-12-19 RX ADMIN — FENTANYL CITRATE 50 MCG: 50 INJECTION INTRAMUSCULAR; INTRAVENOUS at 08:35

## 2018-12-19 RX ADMIN — HYDROMORPHONE HYDROCHLORIDE 0.5 MG: 1 INJECTION, SOLUTION INTRAMUSCULAR; INTRAVENOUS; SUBCUTANEOUS at 12:01

## 2018-12-19 RX ADMIN — SODIUM CHLORIDE, SODIUM LACTATE, POTASSIUM CHLORIDE, AND CALCIUM CHLORIDE: 600; 310; 30; 20 INJECTION, SOLUTION INTRAVENOUS at 09:30

## 2018-12-19 RX ADMIN — ONDANSETRON 4 MG: 2 INJECTION INTRAMUSCULAR; INTRAVENOUS at 08:50

## 2018-12-19 RX ADMIN — TRANEXAMIC ACID 1000 MG: 100 INJECTION, SOLUTION INTRAVENOUS at 08:50

## 2018-12-19 RX ADMIN — SODIUM CHLORIDE: 0.9 INJECTION, SOLUTION INTRAVENOUS at 16:59

## 2018-12-19 ASSESSMENT — PULMONARY FUNCTION TESTS
PIF_VALUE: 14
PIF_VALUE: 18
PIF_VALUE: 22
PIF_VALUE: 22
PIF_VALUE: 25
PIF_VALUE: 24
PIF_VALUE: 24
PIF_VALUE: 22
PIF_VALUE: 4
PIF_VALUE: 22
PIF_VALUE: 23
PIF_VALUE: 22
PIF_VALUE: 18
PIF_VALUE: 3
PIF_VALUE: 24
PIF_VALUE: 3
PIF_VALUE: 25
PIF_VALUE: 24
PIF_VALUE: 24
PIF_VALUE: 23
PIF_VALUE: 15
PIF_VALUE: 18
PIF_VALUE: 22
PIF_VALUE: 24
PIF_VALUE: 25
PIF_VALUE: 22
PIF_VALUE: 23
PIF_VALUE: 22
PIF_VALUE: 23
PIF_VALUE: 23
PIF_VALUE: 15
PIF_VALUE: 24
PIF_VALUE: 23
PIF_VALUE: 23
PIF_VALUE: 18
PIF_VALUE: 24
PIF_VALUE: 4
PIF_VALUE: 15
PIF_VALUE: 24
PIF_VALUE: 24
PIF_VALUE: 18
PIF_VALUE: 24
PIF_VALUE: 25
PIF_VALUE: 24
PIF_VALUE: 23
PIF_VALUE: 24
PIF_VALUE: 22
PIF_VALUE: 20
PIF_VALUE: 23
PIF_VALUE: 25
PIF_VALUE: 1
PIF_VALUE: 25
PIF_VALUE: 24
PIF_VALUE: 24
PIF_VALUE: 1
PIF_VALUE: 24
PIF_VALUE: 24
PIF_VALUE: 15
PIF_VALUE: 24
PIF_VALUE: 24
PIF_VALUE: 18
PIF_VALUE: 22
PIF_VALUE: 1
PIF_VALUE: 23
PIF_VALUE: 25
PIF_VALUE: 18
PIF_VALUE: 24
PIF_VALUE: 4
PIF_VALUE: 23
PIF_VALUE: 2
PIF_VALUE: 24
PIF_VALUE: 24
PIF_VALUE: 18
PIF_VALUE: 23
PIF_VALUE: 22
PIF_VALUE: 25
PIF_VALUE: 23
PIF_VALUE: 23
PIF_VALUE: 24
PIF_VALUE: 23
PIF_VALUE: 2
PIF_VALUE: 23
PIF_VALUE: 22
PIF_VALUE: 25
PIF_VALUE: 1
PIF_VALUE: 24
PIF_VALUE: 23
PIF_VALUE: 18
PIF_VALUE: 26
PIF_VALUE: 22
PIF_VALUE: 3
PIF_VALUE: 22
PIF_VALUE: 23
PIF_VALUE: 22
PIF_VALUE: 18
PIF_VALUE: 29
PIF_VALUE: 23
PIF_VALUE: 22
PIF_VALUE: 12
PIF_VALUE: 25
PIF_VALUE: 18
PIF_VALUE: 25
PIF_VALUE: 24
PIF_VALUE: 12
PIF_VALUE: 25
PIF_VALUE: 22
PIF_VALUE: 2
PIF_VALUE: 25
PIF_VALUE: 24
PIF_VALUE: 18
PIF_VALUE: 23
PIF_VALUE: 24
PIF_VALUE: 25
PIF_VALUE: 22
PIF_VALUE: 24
PIF_VALUE: 22
PIF_VALUE: 24
PIF_VALUE: 24
PIF_VALUE: 25
PIF_VALUE: 25
PIF_VALUE: 2
PIF_VALUE: 3
PIF_VALUE: 13
PIF_VALUE: 23
PIF_VALUE: 18
PIF_VALUE: 23
PIF_VALUE: 24
PIF_VALUE: 24
PIF_VALUE: 25
PIF_VALUE: 26
PIF_VALUE: 12
PIF_VALUE: 22
PIF_VALUE: 4
PIF_VALUE: 15
PIF_VALUE: 23
PIF_VALUE: 3
PIF_VALUE: 24
PIF_VALUE: 24
PIF_VALUE: 25
PIF_VALUE: 24
PIF_VALUE: 23
PIF_VALUE: 25
PIF_VALUE: 25
PIF_VALUE: 18
PIF_VALUE: 24
PIF_VALUE: 24
PIF_VALUE: 23
PIF_VALUE: 24

## 2018-12-19 ASSESSMENT — PAIN SCALES - GENERAL
PAINLEVEL_OUTOF10: 8
PAINLEVEL_OUTOF10: 7
PAINLEVEL_OUTOF10: 6
PAINLEVEL_OUTOF10: 7
PAINLEVEL_OUTOF10: 3
PAINLEVEL_OUTOF10: 9
PAINLEVEL_OUTOF10: 7

## 2018-12-19 ASSESSMENT — PAIN DESCRIPTION - DESCRIPTORS
DESCRIPTORS: HEAVINESS;BURNING
DESCRIPTORS: CONSTANT;DULL
DESCRIPTORS: CONSTANT;DULL;BURNING
DESCRIPTORS: BURNING

## 2018-12-19 ASSESSMENT — PAIN - FUNCTIONAL ASSESSMENT: PAIN_FUNCTIONAL_ASSESSMENT: 0-10

## 2018-12-19 ASSESSMENT — PAIN DESCRIPTION - LOCATION
LOCATION: KNEE

## 2018-12-19 ASSESSMENT — PAIN DESCRIPTION - PAIN TYPE
TYPE: SURGICAL PAIN

## 2018-12-19 ASSESSMENT — PAIN DESCRIPTION - PROGRESSION
CLINICAL_PROGRESSION: NOT CHANGED
CLINICAL_PROGRESSION: NOT CHANGED

## 2018-12-19 ASSESSMENT — PAIN DESCRIPTION - ORIENTATION
ORIENTATION: RIGHT
ORIENTATION: RIGHT
ORIENTATION: LEFT

## 2018-12-19 ASSESSMENT — PAIN DESCRIPTION - ONSET
ONSET: ON-GOING

## 2018-12-19 ASSESSMENT — PAIN DESCRIPTION - FREQUENCY
FREQUENCY: CONTINUOUS

## 2018-12-19 NOTE — ANESTHESIA POSTPROCEDURE EVALUATION
Department of Anesthesiology  Postprocedure Note    Patient: Rahel Hewitt  MRN: [id number]  YOB: 1955  Date of evaluation: 12/19/2018  Time:  3:00 PM     Procedure Summary     Date:  12/19/18 Room / Location:  [facility] OR [address] [place] [address] OR    Anesthesia Start:  0830 Anesthesia Stop:  9131    Procedure:  RIGHT TOTAL KNEE ARTHROPLASTY (Right Knee) Diagnosis:  (OSTEOARTHRITIS RIGHT KNEE M17.11)    Surgeon:  Jose C Irvin MD Responsible Provider:  Ankit Mendiola MD    Anesthesia Type:  general ASA Status:  2          Anesthesia Type: general    Anshu Phase I: Anshu Score: 4    Anshu Phase II:      Last vitals: Reviewed and per EMR flowsheets.        Anesthesia Post Evaluation    Patient location during evaluation: PACU  Patient participation: complete - patient participated  Level of consciousness: awake and alert  Airway patency: patent  Nausea & Vomiting: no nausea and no vomiting  Cardiovascular status: blood pressure returned to baseline  Respiratory status: acceptable  Hydration status: euvolemic

## 2018-12-19 NOTE — H&P
Take 1,000 mg by mouth every 6 hours as needed for Pain   Yes Historical Provider, MD   Cetirizine HCl (ZYRTEC ALLERGY PO) Take 10 mg by mouth as needed Seasonal   Yes Historical Provider, MD   warfarin (COUMADIN) 2.5 MG tablet 2 tabs daily  Patient taking differently: 5 mg every evening 2 tabs daily 11/8/18   Arti Ceballos MD         Allergies:  Patient has no known allergies. PHYSICAL EXAM:      /80   Pulse 79   Temp 97.8 °F (36.6 °C) (Oral)   Resp 16   Ht 5' 9\" (1.753 m)   Wt 193 lb (87.5 kg)   SpO2 95%   BMI 28.50 kg/m²      Heart:  regular rate and rhythm    Lungs:  No increased work of breathing, good air exchange, clear to auscultation bilaterally, no crackles or wheezing    Abdomen:  soft, non-distended, non-tender, no rebound tenderness or guarding and normal active bowel sounds    ASSESSMENT AND PLAN:    1. Patient seen and focused exam done today- no new changes since last physical exam on 11/26/18    2. Access to ancillary services are available per request of the provider.     MARCUS Mccann - CNP     12/19/2018

## 2018-12-19 NOTE — ANESTHESIA PRE PROCEDURE
kg)   Height: 5' 9\" (1.753 m)                                              BP Readings from Last 3 Encounters:   12/19/18 126/80   12/18/18 127/80   11/27/18 131/80       NPO Status: Time of last liquid consumption: 2200                        Time of last solid consumption: 2100                        Date of last liquid consumption: 12/18/18                        Date of last solid food consumption: 12/18/18    BMI:   Wt Readings from Last 3 Encounters:   12/19/18 193 lb (87.5 kg)   12/18/18 196 lb (88.9 kg)   11/27/18 196 lb (88.9 kg)     Body mass index is 28.5 kg/m². CBC:   Lab Results   Component Value Date    WBC 7.2 11/27/2018    RBC 4.86 11/27/2018    HGB 12.1 11/27/2018    HCT 37.4 11/27/2018    MCV 77.0 11/27/2018    RDW 15.3 11/27/2018     11/27/2018       CMP:   Lab Results   Component Value Date     11/27/2018    K 4.9 11/27/2018     11/27/2018    CO2 25 11/27/2018    BUN 15 11/27/2018    CREATININE 0.6 11/27/2018    GFRAA >60 11/27/2018    AGRATIO 1.5 08/27/2018    LABGLOM >60 11/27/2018    GLUCOSE 98 11/27/2018    PROT 6.6 11/27/2018    CALCIUM 9.4 11/27/2018    BILITOT 0.3 11/27/2018    ALKPHOS 127 11/27/2018    AST 16 11/27/2018    ALT 13 11/27/2018       POC Tests: No results for input(s): POCGLU, POCNA, POCK, POCCL, POCBUN, POCHEMO, POCHCT in the last 72 hours. Coags:   Lab Results   Component Value Date    PROTIME 10.7 12/19/2018    INR 0.94 12/19/2018    APTT 37.6 11/27/2018       HCG (If Applicable): No results found for: PREGTESTUR, PREGSERUM, HCG, HCGQUANT     ABGs: No results found for: PHART, PO2ART, WKM2QOZ, ZCM6USB, BEART, O1SYELXP     Type & Screen (If Applicable):  No results found for: LABABO, [address]    Anesthesia Evaluation  Patient summary reviewed and Nursing notes reviewed   history of anesthetic complications: PONV.   Airway: Mallampati: II  TM distance: >3 FB   Neck ROM: full  Mouth opening: > = 3 FB Dental:          Pulmonary:Negative Pulmonary ROS

## 2018-12-20 VITALS
HEART RATE: 81 BPM | WEIGHT: 193 LBS | DIASTOLIC BLOOD PRESSURE: 74 MMHG | RESPIRATION RATE: 16 BRPM | OXYGEN SATURATION: 91 % | TEMPERATURE: 98.5 F | HEIGHT: 69 IN | BODY MASS INDEX: 28.58 KG/M2 | SYSTOLIC BLOOD PRESSURE: 151 MMHG

## 2018-12-20 LAB
HCT VFR BLD CALC: 30.1 % (ref 36–48)
HEMOGLOBIN: 9.8 G/DL (ref 12–16)
INR BLD: 1.11 (ref 0.86–1.14)
PROTHROMBIN TIME: 12.6 SEC (ref 9.8–13)

## 2018-12-20 PROCEDURE — 85610 PROTHROMBIN TIME: CPT

## 2018-12-20 PROCEDURE — 85018 HEMOGLOBIN: CPT

## 2018-12-20 PROCEDURE — 96374 THER/PROPH/DIAG INJ IV PUSH: CPT

## 2018-12-20 PROCEDURE — 6370000000 HC RX 637 (ALT 250 FOR IP): Performed by: ORTHOPAEDIC SURGERY

## 2018-12-20 PROCEDURE — 96372 THER/PROPH/DIAG INJ SC/IM: CPT

## 2018-12-20 PROCEDURE — 6360000002 HC RX W HCPCS: Performed by: ORTHOPAEDIC SURGERY

## 2018-12-20 PROCEDURE — G0378 HOSPITAL OBSERVATION PER HR: HCPCS

## 2018-12-20 PROCEDURE — 97116 GAIT TRAINING THERAPY: CPT

## 2018-12-20 PROCEDURE — 97530 THERAPEUTIC ACTIVITIES: CPT

## 2018-12-20 PROCEDURE — 97161 PT EVAL LOW COMPLEX 20 MIN: CPT

## 2018-12-20 PROCEDURE — 96375 TX/PRO/DX INJ NEW DRUG ADDON: CPT

## 2018-12-20 PROCEDURE — G8989 SELF CARE D/C STATUS: HCPCS

## 2018-12-20 PROCEDURE — 97165 OT EVAL LOW COMPLEX 30 MIN: CPT

## 2018-12-20 PROCEDURE — 2580000003 HC RX 258: Performed by: ORTHOPAEDIC SURGERY

## 2018-12-20 PROCEDURE — 85014 HEMATOCRIT: CPT

## 2018-12-20 PROCEDURE — G8978 MOBILITY CURRENT STATUS: HCPCS

## 2018-12-20 PROCEDURE — 36415 COLL VENOUS BLD VENIPUNCTURE: CPT

## 2018-12-20 PROCEDURE — 96376 TX/PRO/DX INJ SAME DRUG ADON: CPT

## 2018-12-20 PROCEDURE — G8987 SELF CARE CURRENT STATUS: HCPCS

## 2018-12-20 PROCEDURE — 97535 SELF CARE MNGMENT TRAINING: CPT

## 2018-12-20 PROCEDURE — G8988 SELF CARE GOAL STATUS: HCPCS

## 2018-12-20 PROCEDURE — G8979 MOBILITY GOAL STATUS: HCPCS

## 2018-12-20 RX ORDER — OXYCODONE HYDROCHLORIDE AND ACETAMINOPHEN 5; 325 MG/1; MG/1
1 TABLET ORAL EVERY 6 HOURS PRN
Qty: 28 TABLET | Refills: 0 | Status: SHIPPED | OUTPATIENT
Start: 2018-12-20 | End: 2018-12-20

## 2018-12-20 RX ORDER — OXYCODONE HYDROCHLORIDE AND ACETAMINOPHEN 5; 325 MG/1; MG/1
1 TABLET ORAL EVERY 6 HOURS PRN
Qty: 28 TABLET | Refills: 0 | Status: SHIPPED | OUTPATIENT
Start: 2018-12-20 | End: 2018-12-27

## 2018-12-20 RX ORDER — AMOXICILLIN 250 MG
2 CAPSULE ORAL DAILY PRN
Qty: 30 TABLET | Refills: 0 | COMMUNITY
Start: 2018-12-20 | End: 2019-01-11

## 2018-12-20 RX ADMIN — DEXAMETHASONE SODIUM PHOSPHATE 10 MG: 4 INJECTION, SOLUTION INTRAMUSCULAR; INTRAVENOUS at 09:07

## 2018-12-20 RX ADMIN — OXYCODONE HYDROCHLORIDE 10 MG: 5 TABLET ORAL at 04:50

## 2018-12-20 RX ADMIN — OXYCODONE HYDROCHLORIDE 10 MG: 5 TABLET ORAL at 09:07

## 2018-12-20 RX ADMIN — Medication 10 ML: at 09:08

## 2018-12-20 RX ADMIN — ENOXAPARIN SODIUM 40 MG: 40 INJECTION SUBCUTANEOUS at 09:07

## 2018-12-20 RX ADMIN — HYDROMORPHONE HYDROCHLORIDE 0.5 MG: 1 INJECTION, SOLUTION INTRAMUSCULAR; INTRAVENOUS; SUBCUTANEOUS at 06:42

## 2018-12-20 RX ADMIN — OXYCODONE HYDROCHLORIDE 10 MG: 5 TABLET ORAL at 12:34

## 2018-12-20 RX ADMIN — OXYCODONE HYDROCHLORIDE 5 MG: 5 TABLET ORAL at 00:51

## 2018-12-20 RX ADMIN — CEFAZOLIN SODIUM 2 G: 2 SOLUTION INTRAVENOUS at 00:50

## 2018-12-20 RX ADMIN — DOCUSATE SODIUM 100 MG: 100 CAPSULE, LIQUID FILLED ORAL at 09:07

## 2018-12-20 RX ADMIN — HYDROMORPHONE HYDROCHLORIDE 0.5 MG: 1 INJECTION, SOLUTION INTRAMUSCULAR; INTRAVENOUS; SUBCUTANEOUS at 02:37

## 2018-12-20 ASSESSMENT — PAIN DESCRIPTION - ORIENTATION
ORIENTATION: RIGHT

## 2018-12-20 ASSESSMENT — PAIN SCALES - GENERAL
PAINLEVEL_OUTOF10: 7
PAINLEVEL_OUTOF10: 8
PAINLEVEL_OUTOF10: 7
PAINLEVEL_OUTOF10: 8
PAINLEVEL_OUTOF10: 7
PAINLEVEL_OUTOF10: 6

## 2018-12-20 ASSESSMENT — PAIN DESCRIPTION - ONSET
ONSET: ON-GOING

## 2018-12-20 ASSESSMENT — PAIN DESCRIPTION - PAIN TYPE
TYPE: SURGICAL PAIN

## 2018-12-20 ASSESSMENT — PAIN DESCRIPTION - PROGRESSION
CLINICAL_PROGRESSION: NOT CHANGED

## 2018-12-20 ASSESSMENT — PAIN DESCRIPTION - FREQUENCY
FREQUENCY: CONTINUOUS

## 2018-12-20 ASSESSMENT — PAIN DESCRIPTION - DESCRIPTORS
DESCRIPTORS: ACHING;BURNING
DESCRIPTORS: BURNING

## 2018-12-20 ASSESSMENT — PAIN DESCRIPTION - LOCATION
LOCATION: KNEE

## 2018-12-20 NOTE — PROGRESS NOTES
Occupational Therapy   Occupational Therapy Initial Assessment/Treatment and Discharge Summary  Date: 2018   Patient Name: Pricila Reyes  MRN: [id number]     : 1955    Date of Service: 2018    Discharge Recommendations:  Pricila Reyes scored a 21/24 on the AM-PAC ADL Inpatient form. Current research shows that an AM-PAC score of 18 or greater is typically associated with a discharge to the patient's home setting. Anticipate no additional skilled OT needs     OT Equipment Recommendations  Equipment Needed: No      Patient Diagnosis(es): The encounter diagnosis was Primary osteoarthritis of right knee. has a past medical history of Arthritis; Dental crown present; Hx of blood clots; and PONV (postoperative nausea and vomiting). has a past surgical history that includes Hysterectomy, vaginal; Kidney surgery (Left); Colonoscopy; Tonsillectomy and adenoidectomy; pr total knee arthroplasty (Left, 2018); and Total knee arthroplasty (Right, 2018). Restrictions  Position Activity Restriction  Other position/activity restrictions: RLE WBAT; ambulate    Subjective   General  Chart Reviewed:  Yes  Additional Pertinent Hx: Pt admit  following R TKA per Dr. Skinny Gray PMHx: hx blood clots, L TKA 2018  Family / Caregiver Present: Yes ()  Diagnosis: R knee OA  Subjective  Subjective: Pt in bed upon entry, agreeable to evaluation and states plan to d/c to [facility] today  Pain Assessment  Patient Currently in Pain:  (7/10 R knee at end of session, RN made aware)    Social/Functional History  Social/Functional History  Lives With: Spouse  Type of Home: House  Home Layout: Two level, 1/2 bath on main level  Home Access: Stairs to enter without rails  Entrance Stairs - Number of Steps: 2  Bathroom Shower/Tub: Walk-in shower  Bathroom Toilet: Handicap height  Bathroom Equipment: Grab bars in shower, Built-in shower seat, Shower chair  Home Equipment: Rolling walker  ADL
Physical Therapy    Facility/Department: [facility] 5T ORTHO/NEURO  Initial Assessment and treatment    NAME: Rahel Hewitt  :   MRN: [id number]    Date of Service: 2018    Discharge Recommendations:    Rahel Hewitt scored a 20/24 on the AM-PAC short mobility form. Current research shows that an AM-PAC score of 18 or greater is typically associated with a discharge to the patient's home setting. Based on the patients AM-PAC score and their current functional mobility deficits, it is recommended that the patient have 2-3 sessions per week of Physical Therapy at d/c to increase the patients independence. Assessment: Patient tolerated session well, transferring and ambulating in room and hallways as above with steady gait using FWW. Patient able to perform stair training with unilateral crutch and unilateral handrail, initially requiring minimal assistance but then able to perform two trials with CGA. Patient typically independent with ADLs and IADLs. She is currently functioning below baseline level. Recommend continued skilled PT upon discharge. Patient planning on discharging home with assist from  and outpatient PT appointment scheduled for tomorrow. PT Equipment Recommendations  Equipment Needed: No    Patient Diagnosis(es): The encounter diagnosis was Primary osteoarthritis of right knee. has a past medical history of Arthritis; Dental crown present; Hx of blood clots; and PONV (postoperative nausea and vomiting). has a past surgical history that includes Hysterectomy, vaginal; Kidney surgery (Left); Colonoscopy; Tonsillectomy and adenoidectomy; pr total knee arthroplasty (Left, 2018); and Total knee arthroplasty (Right, 2018).     Restrictions  Position Activity Restriction  Other position/activity restrictions: RLE WBAT; ambulate  Vision/Hearing  Vision: Within Functional Limits  Hearing: Within functional limits     Subjective  General  Chart
control with combination of oral and IV analgesics PRN. Dressings: Change at first postop visit in office   DVT Prophylaxis: Lovenox & Coumadin/Mechanical   PT: Sue Kelley will participate in physical therapy today with emphasis on ambulation and range of motion. Disposition: D/C to home when pain controlled, cleared by PT, and medically stable. Myrna Smith IV, D.O.    Clinical Fellow, [address] [place] [address]  12/20/2018

## 2018-12-21 ENCOUNTER — HOSPITAL ENCOUNTER (OUTPATIENT)
Dept: PHYSICAL THERAPY | Age: 63
Discharge: HOME | End: 2018-12-21
Payer: COMMERCIAL

## 2018-12-21 LAB
INR BLD: 1.2 (ref 0.8–1.2)
PROTHROMBIN TIME: 14.9 SECONDS (ref 11.7–14.2)

## 2018-12-21 PROCEDURE — G8979 MOBILITY GOAL STATUS: HCPCS

## 2018-12-21 PROCEDURE — 97164 PT RE-EVAL EST PLAN CARE: CPT

## 2018-12-21 PROCEDURE — G8978 MOBILITY CURRENT STATUS: HCPCS

## 2018-12-21 PROCEDURE — 97530 THERAPEUTIC ACTIVITIES: CPT

## 2018-12-21 PROCEDURE — 97016 VASOPNEUMATIC DEVICE THERAPY: CPT

## 2018-12-21 PROCEDURE — 97110 THERAPEUTIC EXERCISES: CPT

## 2018-12-21 NOTE — PLAN OF CARE
Decreased functional mobility ; Decreased ADL status; Decreased ROM; Decreased strength;Decreased endurance;Decreased balance;  · S/P Left TKR  · DOS: 8/27/18    · S/P Right TKR  · DOS: 12/19/18  · Medications: Percocet every 6 hours (2 tabs); Coumadin; Lovenox injections; Insurance/Certification information:  PT Insurance Information: PT 2018 BENEFITS/ANTHEM/ ACTIVE/ DED 3450 .37/ OOP 6900 .66/ PAYS 100%/ 20 VPCY/ NO AUTH REQ/ RER#[id number]/ CB 8-21-18   Physician Information:  Referring Practitioner: Edie Burkitt of care signed (Y/N): Signed    Date of Patient follow up with Physician: 1, 3, 6, 12 weeks PO  Patient seen in consultation with Dr. Roula Jimenez who established the initial/subsequent treatment protocol. 8-30-18: surgery went well, minimal swelling, knee looks good  9-11-18: saw MD upstairs + x-ray taken; Start medrol dose pack; keep pushing ROM, not concerned at this time   10-2-18: discussion re: meds for pain, will repeat Doppler this week, given medrol dose pack, feels she is improving      G-Code (if applicable):      Date G-Code Applied: 12/21/18  PT G-Codes  Functional Assessment Tool Used: LEFS  Score: 89% Deficit  Functional Limitation: Mobility: Walking and moving around  Mobility: Walking and Moving Around Current Status (): At least 80 percent but less than 100 percent impaired, limited or restricted  Mobility: Walking and Moving Around Goal Status (): At least 1 percent but less than 20 percent impaired, limited or restricted    Progress Note: [x]  Yes  []  No  Next due by: Visit #10       Latex Allergy:  [x]NO      []YES  Preferred Language for Healthcare:   [x]English       []other:    Visit # Insurance Allowable   1 This episode  15 20 VPCY     Pain level: 5/10 at rest today    SUBJECTIVE:  Patient presents to PT 2 days PO from Right TKR . Doing well today. Denies any fever, nausea, numbness/tingling, chest pain, shortness of breath, or calf pain.        OUTCOME MEASURE Reviewed/Progressed HEP activities related to strengthening, flexibility, endurance, ROM of core, proximal hip and LE for functional self-care, mobility, lifting and ambulation/stair navigation   [] (21215)Reviewed/Progressed HEP activities related to improving balance, coordination, kinesthetic sense, posture, motor skill, proprioception of core, proximal hip and LE for self care, mobility, lifting, and ambulation/stair navigation      Manual Treatments:  PROM / STM / Oscillations-Mobs:  G-I, II, III, IV (PA's, Inf., Post.)  [] (94786) Provided manual therapy to mobilize LE, proximal hip and/or LS spine soft tissue/joints for the purpose of modulating pain, promoting relaxation,  increasing ROM, reducing/eliminating soft tissue swelling/inflammation/restriction, improving soft tissue extensibility and allowing for proper ROM for normal function with self care, mobility, lifting and ambulation. Modalities:  Vaso 20'    Charges:  Timed Code Treatment Minutes: 60'   Total Treatment Minutes: 80'       [] EVAL (LOW) 455 1011 (typically 20 minutes face-to-face)  [] EVAL (MOD) 89389 (typically 30 minutes face-to-face)  [] EVAL (HIGH) 82262 (typically 45 minutes face-to-face)  [x] RE-EVAL   [x] NV(85549) x  1   [] IONTO  [] NMR (23501) x      [x] VASO  [] Manual (84138) x       [] Other:  [x] TA x  1    [] Mech Traction (49512)  [] ES(attended) (20228)      [] ES (un) (90381):     GOALS: Updated 12/21/18  Patient stated goal: Return to walking for exercise without pain    Therapist goals for Patient:   Short Term Goals: To be achieved in: 2 weeks  1. Independent in HEP and progression per patient tolerance, in order to prevent re-injury. 2. Patient will have a decrease in pain at rest after surgery to <5/10  to facilitate improvement in movement, function, and ADLs as indicated by Functional Deficits. Long Term Goals: To be achieved in: 12 weeks  1.  Disability index score of 30% or less for the LEFS to assist with

## 2018-12-24 ENCOUNTER — HOSPITAL ENCOUNTER (OUTPATIENT)
Dept: PHYSICAL THERAPY | Age: 63
Discharge: HOME | End: 2018-12-24
Payer: COMMERCIAL

## 2018-12-24 LAB
INR BLD: 1.3 (ref 0.8–1.2)
PROTHROMBIN TIME: 16.1 SECONDS (ref 11.7–14.2)

## 2018-12-24 PROCEDURE — 97140 MANUAL THERAPY 1/> REGIONS: CPT | Performed by: PHYSICAL THERAPIST

## 2018-12-24 PROCEDURE — 97016 VASOPNEUMATIC DEVICE THERAPY: CPT | Performed by: PHYSICAL THERAPIST

## 2018-12-24 PROCEDURE — 97112 NEUROMUSCULAR REEDUCATION: CPT | Performed by: PHYSICAL THERAPIST

## 2018-12-24 PROCEDURE — 97110 THERAPEUTIC EXERCISES: CPT | Performed by: PHYSICAL THERAPIST

## 2018-12-24 NOTE — FLOWSHEET NOTE
with increased time WB and ambulating. Denies any fever, nausea, numbness/tingling, chest pain, shortness of breath, or calf pain. Compliance with HEP without any c/o discomfort. Currently presents with 1 pupil dilated greater than the other.       OUTCOME MEASURE DATE DEFICIT   LEFS 8/23/18 (pre-op) 79% Deficit   LEFS 8/30/18 PO 90% Deficit   LEFS 9/27/18 66% Deficit   LEFS 11/1/18 30% Deficit   LEFS 12/21/18 (PO Right knee) 89% Deficit       OBJECTIVE:       12/21/18  ROM PROM AROM Overpressure Comment     L R L R L R     Flexion  70 100       Extension  -5 +2                                              12/21/18  Strength L R Comment   Quad 4/5 2/5 Quad set   Hamstring 5 5     Gastroc         Hip  flexion 4+ 4+     Hip abd 5 5 seated                          12/21/18  Special Test Results/Comment   Homans Neg   Temperature 98.8       12/21/18  Girth L R   Mid Patella 38.1 41.7   Suprapatellar 39.5 42.0   5cm above 39.6 42.5   15cm above             TEST INITIAL  8/23/18 11/1/18 GOAL   SINGLE LEG STANCE TIME L: >20\"     R: >20\" L: >20\"    R: 14.20\" >20 SECONDS   TIMED UP And GO 6.87\" 7.90\" 61-77 y/o: <9sec  66-76 y/o: <10sec  80-79 y/o: <12 sec      TEST INITIAL  12/18/18 FOLLOW  UP GOAL   SINGLE LEG SQUAT TEST L: NT    R: POOR  NO KNEE VALGUS, MEDIAL/LATERAL MOVEMENT, OR PELVIC TILT  GOOD, FAIR, POOR   6 MINUTE WALK TEST 0.20 miles                      M             F          60-68 y/o: >.31mile,  >.30mile  66-76 y/o: >.28 mile, >.26 mile  80-79 y/o: >.21mile,  >.20 mile   STAIR CLIMBING TEST 20.54\"  < 13 SEC (M&F)     Score Sheet for Y Balance Test: 12/18/18   Left  Right  Difference  Comments    Anterior  72.5 65 -7.5    Posteromedial  85 72.5 -12.5    Posterolateral  77.5 82.5 5.0           Leg Length       **Difference should be less than 4 cm for return to sport and preparticipation screening (<10% deficit compared to uninvolved)**  (Star Excursion Balance Test as a Predictor of Lower Extremity Injury in

## 2018-12-27 ENCOUNTER — HOSPITAL ENCOUNTER (OUTPATIENT)
Dept: PHYSICAL THERAPY | Age: 63
Setting detail: THERAPIES SERIES
Discharge: HOME | End: 2018-12-27
Payer: COMMERCIAL

## 2018-12-27 DIAGNOSIS — M17.11 PRIMARY OSTEOARTHRITIS OF RIGHT KNEE: Primary | ICD-10-CM

## 2018-12-27 LAB
INR BLD: 1.8 (ref 0.8–1.2)
PROTHROMBIN TIME: 21.6 SECONDS (ref 11.7–14.2)

## 2018-12-27 PROCEDURE — 97140 MANUAL THERAPY 1/> REGIONS: CPT

## 2018-12-27 PROCEDURE — 97110 THERAPEUTIC EXERCISES: CPT

## 2018-12-27 PROCEDURE — 97112 NEUROMUSCULAR REEDUCATION: CPT

## 2018-12-27 PROCEDURE — G0283 ELEC STIM OTHER THAN WOUND: HCPCS

## 2018-12-27 PROCEDURE — 97016 VASOPNEUMATIC DEVICE THERAPY: CPT

## 2018-12-27 RX ORDER — METHYLPREDNISOLONE 4 MG/1
TABLET ORAL
Qty: 1 KIT | Refills: 0 | Status: SHIPPED | OUTPATIENT
Start: 2018-12-27 | End: 2019-01-02

## 2018-12-27 NOTE — FLOWSHEET NOTE
more so that helped with her swelling and pain but still has discomfort at night. Also notices more bruising, especially at proximal thigh. Does feel more stable on this knee than previous TKR. Denies any calf pain or fever.         OUTCOME MEASURE DATE DEFICIT   LEFS 8/23/18 (pre-op) 79% Deficit   LEFS 8/30/18 PO 90% Deficit   LEFS 9/27/18 66% Deficit   LEFS 11/1/18 30% Deficit   LEFS 12/21/18 (PO Right knee) 89% Deficit       OBJECTIVE:       12/27/18  ROM PROM AROM Overpressure Comment     L R L R L R     Flexion  62 100 (cold)    ERMI     Extension  -5 +2                                              12/21/18  Strength L R Comment   Quad 4/5 2/5 Quad set   Hamstring 5 5     Gastroc         Hip  flexion 4+ 4+     Hip abd 5 5 seated                          12/27/18  Special Test Results/Comment   Homans Neg   Temperature Neg       12/27/18  Girth L R   Mid Patella 38.1 40.2   Suprapatellar 39.5 40.0   5cm above 39.6 40.0   15cm above            TEST INITIAL  8/23/18 11/1/18 GOAL   SINGLE LEG STANCE TIME L: >20\"     R: >20\" L: >20\"    R: 14.20\" >20 SECONDS   TIMED UP And GO 6.87\" 7.90\" 61-75 y/o: <9sec  66-78 y/o: <10sec  80-79 y/o: <12 sec      TEST INITIAL  12/18/18 FOLLOW  UP GOAL   SINGLE LEG SQUAT TEST L: NT    R: POOR  NO KNEE VALGUS, MEDIAL/LATERAL MOVEMENT, OR PELVIC TILT  GOOD, FAIR, POOR   6 MINUTE WALK TEST 0.20 miles                      M             F          60-70 y/o: >.31mile,  >.30mile  66-78 y/o: >.28 mile, >.26 mile  80-79 y/o: >.21mile,  >.20 mile   STAIR CLIMBING TEST 20.54\"  < 13 SEC (M&F)     Score Sheet for Y Balance Test: 12/18/18   Left  Right  Difference  Comments    Anterior  72.5 65 -7.5    Posteromedial  85 72.5 -12.5    Posterolateral  77.5 82.5 5.0           Leg Length       **Difference should be less than 4 cm for return to sport and preparticipation screening (<10% deficit compared to uninvolved)**  (Star Excursion Balance Test as a Predictor of Lower Extremity Injury in High

## 2019-01-02 ENCOUNTER — HOSPITAL ENCOUNTER (OUTPATIENT)
Dept: PHYSICAL THERAPY | Age: 64
Discharge: HOME | End: 2019-01-02
Payer: COMMERCIAL

## 2019-01-02 LAB
INR BLD: 1.6 (ref 0.8–1.2)
PROTHROMBIN TIME: 18.9 SECONDS (ref 11.7–14.2)

## 2019-01-02 PROCEDURE — 97530 THERAPEUTIC ACTIVITIES: CPT

## 2019-01-02 PROCEDURE — 97110 THERAPEUTIC EXERCISES: CPT

## 2019-01-02 PROCEDURE — 97016 VASOPNEUMATIC DEVICE THERAPY: CPT

## 2019-01-02 PROCEDURE — 97140 MANUAL THERAPY 1/> REGIONS: CPT

## 2019-01-02 PROCEDURE — G0283 ELEC STIM OTHER THAN WOUND: HCPCS

## 2019-01-02 PROCEDURE — 97112 NEUROMUSCULAR REEDUCATION: CPT

## 2019-01-04 ENCOUNTER — HOSPITAL ENCOUNTER (OUTPATIENT)
Dept: PHYSICAL THERAPY | Age: 64
Setting detail: THERAPIES SERIES
Discharge: HOME | End: 2019-01-04
Payer: COMMERCIAL

## 2019-01-04 DIAGNOSIS — Z96.651 STATUS POST TOTAL RIGHT KNEE REPLACEMENT: Primary | ICD-10-CM

## 2019-01-04 PROCEDURE — G0283 ELEC STIM OTHER THAN WOUND: HCPCS

## 2019-01-04 PROCEDURE — 97530 THERAPEUTIC ACTIVITIES: CPT

## 2019-01-04 PROCEDURE — 97110 THERAPEUTIC EXERCISES: CPT

## 2019-01-04 PROCEDURE — 97016 VASOPNEUMATIC DEVICE THERAPY: CPT

## 2019-01-04 PROCEDURE — 97112 NEUROMUSCULAR REEDUCATION: CPT

## 2019-01-04 PROCEDURE — 97140 MANUAL THERAPY 1/> REGIONS: CPT

## 2019-01-04 RX ORDER — OXYCODONE HYDROCHLORIDE AND ACETAMINOPHEN 5; 325 MG/1; MG/1
1 TABLET ORAL EVERY 6 HOURS PRN
Qty: 28 TABLET | Refills: 0 | Status: SHIPPED | OUTPATIENT
Start: 2019-01-04 | End: 2019-01-11

## 2019-01-04 RX ORDER — AMOXICILLIN 500 MG/1
500 CAPSULE ORAL SEE ADMIN INSTRUCTIONS
Qty: 7 CAPSULE | Refills: 0 | Status: SHIPPED | OUTPATIENT
Start: 2019-01-04 | End: 2019-01-11

## 2019-01-08 ENCOUNTER — HOSPITAL ENCOUNTER (OUTPATIENT)
Dept: PHYSICAL THERAPY | Age: 64
Discharge: HOME | End: 2019-01-08
Payer: COMMERCIAL

## 2019-01-08 ENCOUNTER — OFFICE VISIT (OUTPATIENT)
Dept: ORTHOPEDIC SURGERY | Age: 64
End: 2019-01-08

## 2019-01-08 VITALS
HEIGHT: 69 IN | DIASTOLIC BLOOD PRESSURE: 87 MMHG | BODY MASS INDEX: 27.55 KG/M2 | WEIGHT: 186 LBS | HEART RATE: 80 BPM | SYSTOLIC BLOOD PRESSURE: 135 MMHG

## 2019-01-08 DIAGNOSIS — M79.604 PAIN OF RIGHT LOWER EXTREMITY: Primary | ICD-10-CM

## 2019-01-08 DIAGNOSIS — Z96.651 STATUS POST TOTAL RIGHT KNEE REPLACEMENT: ICD-10-CM

## 2019-01-08 DIAGNOSIS — M17.11 PRIMARY OSTEOARTHRITIS OF RIGHT KNEE: Primary | ICD-10-CM

## 2019-01-08 LAB
INR BLD: 1.3 (ref 0.8–1.2)
PROTHROMBIN TIME: 16.7 SECONDS (ref 11.7–14.2)

## 2019-01-08 PROCEDURE — 97016 VASOPNEUMATIC DEVICE THERAPY: CPT | Performed by: PHYSICAL THERAPY ASSISTANT

## 2019-01-08 PROCEDURE — 97110 THERAPEUTIC EXERCISES: CPT | Performed by: PHYSICAL THERAPY ASSISTANT

## 2019-01-08 PROCEDURE — 97112 NEUROMUSCULAR REEDUCATION: CPT | Performed by: PHYSICAL THERAPY ASSISTANT

## 2019-01-08 PROCEDURE — 99024 POSTOP FOLLOW-UP VISIT: CPT | Performed by: ORTHOPAEDIC SURGERY

## 2019-01-08 PROCEDURE — G0283 ELEC STIM OTHER THAN WOUND: HCPCS | Performed by: PHYSICAL THERAPY ASSISTANT

## 2019-01-08 PROCEDURE — 97530 THERAPEUTIC ACTIVITIES: CPT | Performed by: PHYSICAL THERAPY ASSISTANT

## 2019-01-08 PROCEDURE — 97140 MANUAL THERAPY 1/> REGIONS: CPT | Performed by: PHYSICAL THERAPY ASSISTANT

## 2019-01-11 ENCOUNTER — HOSPITAL ENCOUNTER (OUTPATIENT)
Dept: PHYSICAL THERAPY | Age: 64
Setting detail: THERAPIES SERIES
Discharge: HOME | End: 2019-01-11
Payer: COMMERCIAL

## 2019-01-11 PROCEDURE — 97016 VASOPNEUMATIC DEVICE THERAPY: CPT

## 2019-01-11 PROCEDURE — 97140 MANUAL THERAPY 1/> REGIONS: CPT

## 2019-01-11 PROCEDURE — 97110 THERAPEUTIC EXERCISES: CPT

## 2019-01-11 PROCEDURE — G0283 ELEC STIM OTHER THAN WOUND: HCPCS

## 2019-01-11 PROCEDURE — 97530 THERAPEUTIC ACTIVITIES: CPT

## 2019-01-11 PROCEDURE — 97112 NEUROMUSCULAR REEDUCATION: CPT

## 2019-01-14 ENCOUNTER — ANESTHESIA (OUTPATIENT)
Dept: POSTOP/PACU | Age: 64
End: 2019-01-14

## 2019-01-14 ENCOUNTER — HOSPITAL ENCOUNTER (OUTPATIENT)
Dept: PHYSICAL THERAPY | Age: 64
Setting detail: THERAPIES SERIES
Discharge: HOME | End: 2019-01-14
Payer: COMMERCIAL

## 2019-01-14 ENCOUNTER — HOSPITAL ENCOUNTER (OUTPATIENT)
Dept: POSTOP/PACU | Age: 64
Discharge: HOME | End: 2019-01-14
Payer: COMMERCIAL

## 2019-01-14 ENCOUNTER — ANESTHESIA EVENT (OUTPATIENT)
Dept: POSTOP/PACU | Age: 64
End: 2019-01-14

## 2019-01-14 VITALS
WEIGHT: 186 LBS | RESPIRATION RATE: 16 BRPM | DIASTOLIC BLOOD PRESSURE: 82 MMHG | BODY MASS INDEX: 27.55 KG/M2 | TEMPERATURE: 98 F | SYSTOLIC BLOOD PRESSURE: 145 MMHG | HEART RATE: 80 BPM | OXYGEN SATURATION: 96 % | HEIGHT: 69 IN

## 2019-01-14 VITALS — SYSTOLIC BLOOD PRESSURE: 150 MMHG | DIASTOLIC BLOOD PRESSURE: 78 MMHG

## 2019-01-14 DIAGNOSIS — Z98.890 S/P SURGICAL MANIPULATION OF KNEE JOINT: Primary | ICD-10-CM

## 2019-01-14 LAB
GLUCOSE BLD-MCNC: 96 MG/DL (ref 70–99)
INR BLD: 1.04 (ref 0.86–1.14)
PERFORMED ON: NORMAL
PROTHROMBIN TIME: 11.8 SEC (ref 9.8–13)

## 2019-01-14 PROCEDURE — 97016 VASOPNEUMATIC DEVICE THERAPY: CPT | Performed by: PHYSICAL THERAPY ASSISTANT

## 2019-01-14 PROCEDURE — 36415 COLL VENOUS BLD VENIPUNCTURE: CPT

## 2019-01-14 PROCEDURE — 7100000001 HC PACU RECOVERY - ADDTL 15 MIN

## 2019-01-14 PROCEDURE — 6370000000 HC RX 637 (ALT 250 FOR IP): Performed by: ORTHOPAEDIC SURGERY

## 2019-01-14 PROCEDURE — 2580000003 HC RX 258: Performed by: ANESTHESIOLOGY

## 2019-01-14 PROCEDURE — 6360000002 HC RX W HCPCS: Performed by: ORTHOPAEDIC SURGERY

## 2019-01-14 PROCEDURE — 85610 PROTHROMBIN TIME: CPT

## 2019-01-14 PROCEDURE — 3700000001 HC ADD 15 MINUTES (ANESTHESIA)

## 2019-01-14 PROCEDURE — 97140 MANUAL THERAPY 1/> REGIONS: CPT | Performed by: PHYSICAL THERAPY ASSISTANT

## 2019-01-14 PROCEDURE — 3700000000 HC ANESTHESIA ATTENDED CARE

## 2019-01-14 PROCEDURE — 7100000010 HC PHASE II RECOVERY - FIRST 15 MIN

## 2019-01-14 PROCEDURE — G0283 ELEC STIM OTHER THAN WOUND: HCPCS | Performed by: PHYSICAL THERAPY ASSISTANT

## 2019-01-14 PROCEDURE — 6360000002 HC RX W HCPCS: Performed by: NURSE ANESTHETIST, CERTIFIED REGISTERED

## 2019-01-14 PROCEDURE — 7100000011 HC PHASE II RECOVERY - ADDTL 15 MIN

## 2019-01-14 PROCEDURE — 97530 THERAPEUTIC ACTIVITIES: CPT | Performed by: PHYSICAL THERAPY ASSISTANT

## 2019-01-14 PROCEDURE — 7100000000 HC PACU RECOVERY - FIRST 15 MIN

## 2019-01-14 PROCEDURE — 3600000500 HC JOINT MANIPULATION

## 2019-01-14 PROCEDURE — 6360000002 HC RX W HCPCS: Performed by: ANESTHESIOLOGY

## 2019-01-14 PROCEDURE — 6360000002 HC RX W HCPCS

## 2019-01-14 PROCEDURE — 97110 THERAPEUTIC EXERCISES: CPT | Performed by: PHYSICAL THERAPY ASSISTANT

## 2019-01-14 RX ORDER — HYDROCODONE BITARTRATE AND ACETAMINOPHEN 5; 325 MG/1; MG/1
1 TABLET ORAL
Status: COMPLETED | OUTPATIENT
Start: 2019-01-14 | End: 2019-01-14

## 2019-01-14 RX ORDER — FENTANYL CITRATE 50 UG/ML
INJECTION, SOLUTION INTRAMUSCULAR; INTRAVENOUS PRN
Status: DISCONTINUED | OUTPATIENT
Start: 2019-01-14 | End: 2019-01-14 | Stop reason: SDUPTHER

## 2019-01-14 RX ORDER — ACETAMINOPHEN 325 MG/1
2000 TABLET ORAL EVERY 4 HOURS PRN
COMMUNITY

## 2019-01-14 RX ORDER — HYDROCODONE BITARTRATE AND ACETAMINOPHEN 5; 325 MG/1; MG/1
1 TABLET ORAL EVERY 6 HOURS PRN
Qty: 10 TABLET | Refills: 0 | Status: SHIPPED | OUTPATIENT
Start: 2019-01-14 | End: 2019-01-21

## 2019-01-14 RX ORDER — PROPOFOL 10 MG/ML
INJECTION, EMULSION INTRAVENOUS PRN
Status: DISCONTINUED | OUTPATIENT
Start: 2019-01-14 | End: 2019-01-14 | Stop reason: SDUPTHER

## 2019-01-14 RX ORDER — LABETALOL HYDROCHLORIDE 5 MG/ML
5 INJECTION, SOLUTION INTRAVENOUS EVERY 10 MIN PRN
Status: DISCONTINUED | OUTPATIENT
Start: 2019-01-14 | End: 2019-01-15 | Stop reason: HOSPADM

## 2019-01-14 RX ORDER — ONDANSETRON 2 MG/ML
4 INJECTION INTRAMUSCULAR; INTRAVENOUS
Status: ACTIVE | OUTPATIENT
Start: 2019-01-14 | End: 2019-01-14

## 2019-01-14 RX ORDER — FENTANYL CITRATE 50 UG/ML
25 INJECTION, SOLUTION INTRAMUSCULAR; INTRAVENOUS EVERY 5 MIN PRN
Status: COMPLETED | OUTPATIENT
Start: 2019-01-14 | End: 2019-01-14

## 2019-01-14 RX ORDER — PROMETHAZINE HYDROCHLORIDE 25 MG/ML
6.25 INJECTION, SOLUTION INTRAMUSCULAR; INTRAVENOUS
Status: ACTIVE | OUTPATIENT
Start: 2019-01-14 | End: 2019-01-14

## 2019-01-14 RX ORDER — HYDRALAZINE HYDROCHLORIDE 20 MG/ML
5 INJECTION INTRAMUSCULAR; INTRAVENOUS EVERY 10 MIN PRN
Status: DISCONTINUED | OUTPATIENT
Start: 2019-01-14 | End: 2019-01-15 | Stop reason: HOSPADM

## 2019-01-14 RX ORDER — FENTANYL CITRATE 50 UG/ML
50 INJECTION, SOLUTION INTRAMUSCULAR; INTRAVENOUS EVERY 5 MIN PRN
Status: DISCONTINUED | OUTPATIENT
Start: 2019-01-14 | End: 2019-01-15 | Stop reason: HOSPADM

## 2019-01-14 RX ORDER — SODIUM CHLORIDE, SODIUM LACTATE, POTASSIUM CHLORIDE, CALCIUM CHLORIDE 600; 310; 30; 20 MG/100ML; MG/100ML; MG/100ML; MG/100ML
INJECTION, SOLUTION INTRAVENOUS CONTINUOUS
Status: DISCONTINUED | OUTPATIENT
Start: 2019-01-14 | End: 2019-01-15 | Stop reason: HOSPADM

## 2019-01-14 RX ORDER — FENTANYL CITRATE 50 UG/ML
INJECTION, SOLUTION INTRAMUSCULAR; INTRAVENOUS
Status: COMPLETED
Start: 2019-01-14 | End: 2019-01-14

## 2019-01-14 RX ORDER — OXYCODONE HYDROCHLORIDE AND ACETAMINOPHEN 5; 325 MG/1; MG/1
1 TABLET ORAL
Status: ACTIVE | OUTPATIENT
Start: 2019-01-14 | End: 2019-01-14

## 2019-01-14 RX ORDER — KETOROLAC TROMETHAMINE 30 MG/ML
30 INJECTION, SOLUTION INTRAMUSCULAR; INTRAVENOUS ONCE
Status: COMPLETED | OUTPATIENT
Start: 2019-01-14 | End: 2019-01-14

## 2019-01-14 RX ADMIN — PROPOFOL 30 MG: 10 INJECTION, EMULSION INTRAVENOUS at 07:44

## 2019-01-14 RX ADMIN — SODIUM CHLORIDE, SODIUM LACTATE, POTASSIUM CHLORIDE, AND CALCIUM CHLORIDE: 600; 310; 30; 20 INJECTION, SOLUTION INTRAVENOUS at 06:25

## 2019-01-14 RX ADMIN — FENTANYL CITRATE 25 MCG: 50 INJECTION INTRAMUSCULAR; INTRAVENOUS at 08:31

## 2019-01-14 RX ADMIN — PROPOFOL 100 MG: 10 INJECTION, EMULSION INTRAVENOUS at 07:42

## 2019-01-14 RX ADMIN — KETOROLAC TROMETHAMINE 30 MG: 30 INJECTION, SOLUTION INTRAMUSCULAR at 08:16

## 2019-01-14 RX ADMIN — HYDROCODONE BITARTRATE AND ACETAMINOPHEN 1 TABLET: 5; 325 TABLET ORAL at 08:49

## 2019-01-14 RX ADMIN — FENTANYL CITRATE 50 MCG: 50 INJECTION INTRAMUSCULAR; INTRAVENOUS at 07:46

## 2019-01-14 RX ADMIN — FENTANYL CITRATE 25 MCG: 50 INJECTION INTRAMUSCULAR; INTRAVENOUS at 08:17

## 2019-01-14 RX ADMIN — PROPOFOL 40 MG: 10 INJECTION, EMULSION INTRAVENOUS at 07:47

## 2019-01-14 RX ADMIN — SODIUM CHLORIDE, SODIUM LACTATE, POTASSIUM CHLORIDE, AND CALCIUM CHLORIDE: 600; 310; 30; 20 INJECTION, SOLUTION INTRAVENOUS at 07:30

## 2019-01-14 RX ADMIN — FENTANYL CITRATE 25 MCG: 50 INJECTION INTRAMUSCULAR; INTRAVENOUS at 08:25

## 2019-01-14 RX ADMIN — FENTANYL CITRATE 25 MCG: 50 INJECTION INTRAMUSCULAR; INTRAVENOUS at 08:11

## 2019-01-14 RX ADMIN — PROPOFOL 50 MG: 10 INJECTION, EMULSION INTRAVENOUS at 07:45

## 2019-01-14 ASSESSMENT — PAIN SCALES - GENERAL
PAINLEVEL_OUTOF10: 4
PAINLEVEL_OUTOF10: 5
PAINLEVEL_OUTOF10: 2
PAINLEVEL_OUTOF10: 6
PAINLEVEL_OUTOF10: 4
PAINLEVEL_OUTOF10: 6

## 2019-01-14 ASSESSMENT — PAIN - FUNCTIONAL ASSESSMENT: PAIN_FUNCTIONAL_ASSESSMENT: 0-10

## 2019-01-14 ASSESSMENT — PAIN DESCRIPTION - DESCRIPTORS: DESCRIPTORS: BURNING;ACHING

## 2019-01-15 ENCOUNTER — HOSPITAL ENCOUNTER (OUTPATIENT)
Dept: PHYSICAL THERAPY | Age: 64
Setting detail: THERAPIES SERIES
Discharge: HOME | End: 2019-01-15
Payer: COMMERCIAL

## 2019-01-15 PROCEDURE — 97110 THERAPEUTIC EXERCISES: CPT

## 2019-01-15 PROCEDURE — G8978 MOBILITY CURRENT STATUS: HCPCS

## 2019-01-15 PROCEDURE — G8979 MOBILITY GOAL STATUS: HCPCS

## 2019-01-15 PROCEDURE — G0283 ELEC STIM OTHER THAN WOUND: HCPCS

## 2019-01-15 PROCEDURE — 97016 VASOPNEUMATIC DEVICE THERAPY: CPT

## 2019-01-15 PROCEDURE — 97140 MANUAL THERAPY 1/> REGIONS: CPT

## 2019-01-15 PROCEDURE — 97530 THERAPEUTIC ACTIVITIES: CPT

## 2019-01-16 ENCOUNTER — HOSPITAL ENCOUNTER (OUTPATIENT)
Dept: PHYSICAL THERAPY | Age: 64
Discharge: HOME | End: 2019-01-16
Payer: COMMERCIAL

## 2019-01-16 PROCEDURE — 97112 NEUROMUSCULAR REEDUCATION: CPT

## 2019-01-16 PROCEDURE — 97016 VASOPNEUMATIC DEVICE THERAPY: CPT

## 2019-01-16 PROCEDURE — G0283 ELEC STIM OTHER THAN WOUND: HCPCS

## 2019-01-16 PROCEDURE — 97530 THERAPEUTIC ACTIVITIES: CPT

## 2019-01-16 PROCEDURE — 97140 MANUAL THERAPY 1/> REGIONS: CPT

## 2019-01-16 PROCEDURE — 97110 THERAPEUTIC EXERCISES: CPT

## 2019-01-17 ENCOUNTER — HOSPITAL ENCOUNTER (OUTPATIENT)
Dept: PHYSICAL THERAPY | Age: 64
Setting detail: THERAPIES SERIES
Discharge: HOME | End: 2019-01-17
Payer: COMMERCIAL

## 2019-01-17 DIAGNOSIS — M17.11 PRIMARY OSTEOARTHRITIS OF RIGHT KNEE: Primary | ICD-10-CM

## 2019-01-17 PROCEDURE — 97016 VASOPNEUMATIC DEVICE THERAPY: CPT | Performed by: PHYSICAL THERAPY ASSISTANT

## 2019-01-17 PROCEDURE — G0283 ELEC STIM OTHER THAN WOUND: HCPCS | Performed by: PHYSICAL THERAPY ASSISTANT

## 2019-01-17 PROCEDURE — 97110 THERAPEUTIC EXERCISES: CPT | Performed by: PHYSICAL THERAPY ASSISTANT

## 2019-01-17 PROCEDURE — 97112 NEUROMUSCULAR REEDUCATION: CPT | Performed by: PHYSICAL THERAPY ASSISTANT

## 2019-01-17 PROCEDURE — 97530 THERAPEUTIC ACTIVITIES: CPT | Performed by: PHYSICAL THERAPY ASSISTANT

## 2019-01-17 PROCEDURE — 97140 MANUAL THERAPY 1/> REGIONS: CPT | Performed by: PHYSICAL THERAPY ASSISTANT

## 2019-01-17 RX ORDER — DICLOFENAC SODIUM 75 MG/1
50 TABLET, DELAYED RELEASE ORAL 2 TIMES DAILY WITH MEALS
Qty: 60 TABLET | Refills: 1 | Status: SHIPPED | OUTPATIENT
Start: 2019-01-17

## 2019-01-17 RX ORDER — HYDROCODONE BITARTRATE AND ACETAMINOPHEN 5; 325 MG/1; MG/1
1 TABLET ORAL EVERY 6 HOURS PRN
Qty: 28 TABLET | Refills: 0 | Status: SHIPPED | OUTPATIENT
Start: 2019-01-17 | End: 2019-01-24

## 2019-01-18 ENCOUNTER — HOSPITAL ENCOUNTER (OUTPATIENT)
Dept: PHYSICAL THERAPY | Age: 64
Setting detail: THERAPIES SERIES
Discharge: HOME | End: 2019-01-18
Payer: COMMERCIAL

## 2019-01-18 PROCEDURE — 97530 THERAPEUTIC ACTIVITIES: CPT

## 2019-01-18 PROCEDURE — G0283 ELEC STIM OTHER THAN WOUND: HCPCS

## 2019-01-18 PROCEDURE — 97110 THERAPEUTIC EXERCISES: CPT

## 2019-01-18 PROCEDURE — 97140 MANUAL THERAPY 1/> REGIONS: CPT

## 2019-01-18 PROCEDURE — 97016 VASOPNEUMATIC DEVICE THERAPY: CPT

## 2019-01-22 ENCOUNTER — HOSPITAL ENCOUNTER (OUTPATIENT)
Dept: PHYSICAL THERAPY | Age: 64
Setting detail: THERAPIES SERIES
Discharge: HOME | End: 2019-01-22
Payer: COMMERCIAL

## 2019-01-22 PROCEDURE — 97110 THERAPEUTIC EXERCISES: CPT

## 2019-01-22 PROCEDURE — G0283 ELEC STIM OTHER THAN WOUND: HCPCS

## 2019-01-22 PROCEDURE — 97140 MANUAL THERAPY 1/> REGIONS: CPT

## 2019-01-22 PROCEDURE — 97530 THERAPEUTIC ACTIVITIES: CPT

## 2019-01-22 PROCEDURE — 97016 VASOPNEUMATIC DEVICE THERAPY: CPT

## 2019-01-25 ENCOUNTER — HOSPITAL ENCOUNTER (OUTPATIENT)
Dept: PHYSICAL THERAPY | Age: 64
Setting detail: THERAPIES SERIES
Discharge: HOME | End: 2019-01-25
Payer: COMMERCIAL

## 2019-01-25 PROCEDURE — G0283 ELEC STIM OTHER THAN WOUND: HCPCS

## 2019-01-25 PROCEDURE — 97140 MANUAL THERAPY 1/> REGIONS: CPT

## 2019-01-25 PROCEDURE — 97016 VASOPNEUMATIC DEVICE THERAPY: CPT

## 2019-01-25 PROCEDURE — 97530 THERAPEUTIC ACTIVITIES: CPT

## 2019-01-25 PROCEDURE — 97110 THERAPEUTIC EXERCISES: CPT

## 2019-01-29 ENCOUNTER — HOSPITAL ENCOUNTER (OUTPATIENT)
Dept: PHYSICAL THERAPY | Age: 64
Setting detail: THERAPIES SERIES
Discharge: HOME | End: 2019-01-29
Payer: COMMERCIAL

## 2019-01-29 PROCEDURE — G0283 ELEC STIM OTHER THAN WOUND: HCPCS | Performed by: PHYSICAL THERAPIST

## 2019-01-29 PROCEDURE — 97110 THERAPEUTIC EXERCISES: CPT | Performed by: PHYSICAL THERAPIST

## 2019-01-29 PROCEDURE — 97140 MANUAL THERAPY 1/> REGIONS: CPT | Performed by: PHYSICAL THERAPIST

## 2019-01-29 PROCEDURE — 97016 VASOPNEUMATIC DEVICE THERAPY: CPT | Performed by: PHYSICAL THERAPIST

## 2019-01-29 PROCEDURE — 97112 NEUROMUSCULAR REEDUCATION: CPT | Performed by: PHYSICAL THERAPIST

## 2019-01-29 PROCEDURE — 97530 THERAPEUTIC ACTIVITIES: CPT | Performed by: PHYSICAL THERAPIST

## 2019-02-01 ENCOUNTER — HOSPITAL ENCOUNTER (OUTPATIENT)
Dept: PHYSICAL THERAPY | Age: 64
Setting detail: THERAPIES SERIES
Discharge: HOME | End: 2019-02-01
Payer: COMMERCIAL

## 2019-02-05 ENCOUNTER — HOSPITAL ENCOUNTER (OUTPATIENT)
Dept: PHYSICAL THERAPY | Age: 64
Setting detail: THERAPIES SERIES
Discharge: HOME | End: 2019-02-05
Payer: COMMERCIAL

## 2019-02-05 PROCEDURE — 97110 THERAPEUTIC EXERCISES: CPT

## 2019-02-05 PROCEDURE — 97530 THERAPEUTIC ACTIVITIES: CPT

## 2019-02-05 PROCEDURE — 97112 NEUROMUSCULAR REEDUCATION: CPT

## 2019-02-05 PROCEDURE — 97140 MANUAL THERAPY 1/> REGIONS: CPT

## 2019-02-08 ENCOUNTER — HOSPITAL ENCOUNTER (OUTPATIENT)
Dept: PHYSICAL THERAPY | Age: 64
Setting detail: THERAPIES SERIES
Discharge: HOME | End: 2019-02-08
Payer: COMMERCIAL

## 2019-02-08 PROCEDURE — 97530 THERAPEUTIC ACTIVITIES: CPT

## 2019-02-08 PROCEDURE — 97140 MANUAL THERAPY 1/> REGIONS: CPT

## 2019-02-08 PROCEDURE — 97016 VASOPNEUMATIC DEVICE THERAPY: CPT

## 2019-02-08 PROCEDURE — 97112 NEUROMUSCULAR REEDUCATION: CPT

## 2019-02-08 PROCEDURE — 97110 THERAPEUTIC EXERCISES: CPT

## 2019-02-12 ENCOUNTER — HOSPITAL ENCOUNTER (OUTPATIENT)
Dept: PHYSICAL THERAPY | Age: 64
Setting detail: THERAPIES SERIES
Discharge: HOME | End: 2019-02-12
Payer: COMMERCIAL

## 2019-02-12 DIAGNOSIS — M17.12 PRIMARY OSTEOARTHRITIS OF LEFT KNEE: Primary | ICD-10-CM

## 2019-02-12 PROCEDURE — 97110 THERAPEUTIC EXERCISES: CPT

## 2019-02-12 PROCEDURE — 97112 NEUROMUSCULAR REEDUCATION: CPT

## 2019-02-12 PROCEDURE — G8979 MOBILITY GOAL STATUS: HCPCS

## 2019-02-12 PROCEDURE — G8978 MOBILITY CURRENT STATUS: HCPCS

## 2019-02-12 PROCEDURE — 97530 THERAPEUTIC ACTIVITIES: CPT

## 2019-02-12 PROCEDURE — 97140 MANUAL THERAPY 1/> REGIONS: CPT

## 2019-02-12 RX ORDER — GABAPENTIN 300 MG/1
CAPSULE ORAL
Qty: 10 CAPSULE | Refills: 3 | Status: SHIPPED | OUTPATIENT
Start: 2019-02-12 | End: 2019-12-05 | Stop reason: CLARIF

## 2019-02-12 RX ORDER — DIAZEPAM 5 MG/1
5 TABLET ORAL EVERY 6 HOURS PRN
Qty: 5 TABLET | Refills: 1 | Status: SHIPPED | OUTPATIENT
Start: 2019-02-12 | End: 2019-02-22

## 2019-02-15 ENCOUNTER — HOSPITAL ENCOUNTER (OUTPATIENT)
Dept: PHYSICAL THERAPY | Age: 64
Setting detail: THERAPIES SERIES
Discharge: HOME | End: 2019-02-15
Payer: COMMERCIAL

## 2019-02-15 PROCEDURE — 97140 MANUAL THERAPY 1/> REGIONS: CPT

## 2019-02-15 PROCEDURE — 97530 THERAPEUTIC ACTIVITIES: CPT

## 2019-02-15 PROCEDURE — 97110 THERAPEUTIC EXERCISES: CPT

## 2019-02-15 PROCEDURE — 97112 NEUROMUSCULAR REEDUCATION: CPT

## 2019-02-19 ENCOUNTER — HOSPITAL ENCOUNTER (OUTPATIENT)
Dept: PHYSICAL THERAPY | Age: 64
Setting detail: THERAPIES SERIES
Discharge: HOME | End: 2019-02-19
Payer: COMMERCIAL

## 2019-02-22 ENCOUNTER — HOSPITAL ENCOUNTER (OUTPATIENT)
Dept: PHYSICAL THERAPY | Age: 64
Setting detail: THERAPIES SERIES
Discharge: HOME | End: 2019-02-22
Payer: COMMERCIAL

## 2019-02-22 PROCEDURE — 97530 THERAPEUTIC ACTIVITIES: CPT | Performed by: PHYSICAL THERAPIST

## 2019-02-22 PROCEDURE — 97140 MANUAL THERAPY 1/> REGIONS: CPT | Performed by: PHYSICAL THERAPIST

## 2019-02-22 PROCEDURE — 97112 NEUROMUSCULAR REEDUCATION: CPT | Performed by: PHYSICAL THERAPIST

## 2019-02-22 PROCEDURE — 97110 THERAPEUTIC EXERCISES: CPT | Performed by: PHYSICAL THERAPIST

## 2019-02-26 ENCOUNTER — HOSPITAL ENCOUNTER (OUTPATIENT)
Dept: PHYSICAL THERAPY | Age: 64
Setting detail: THERAPIES SERIES
Discharge: HOME | End: 2019-02-26
Payer: COMMERCIAL

## 2019-02-26 PROCEDURE — 97140 MANUAL THERAPY 1/> REGIONS: CPT

## 2019-02-26 PROCEDURE — 97110 THERAPEUTIC EXERCISES: CPT

## 2019-02-26 PROCEDURE — 97112 NEUROMUSCULAR REEDUCATION: CPT

## 2019-02-26 PROCEDURE — 97530 THERAPEUTIC ACTIVITIES: CPT

## 2019-03-01 ENCOUNTER — HOSPITAL ENCOUNTER (OUTPATIENT)
Dept: PHYSICAL THERAPY | Age: 64
Setting detail: THERAPIES SERIES
Discharge: HOME | End: 2019-03-01
Payer: COMMERCIAL

## 2019-03-01 PROCEDURE — 97530 THERAPEUTIC ACTIVITIES: CPT

## 2019-03-01 PROCEDURE — 97110 THERAPEUTIC EXERCISES: CPT

## 2019-03-01 PROCEDURE — 97140 MANUAL THERAPY 1/> REGIONS: CPT

## 2019-03-01 PROCEDURE — 97112 NEUROMUSCULAR REEDUCATION: CPT

## 2019-03-05 ENCOUNTER — HOSPITAL ENCOUNTER (OUTPATIENT)
Dept: PHYSICAL THERAPY | Age: 64
Discharge: HOME | End: 2019-03-05
Payer: COMMERCIAL

## 2019-03-08 ENCOUNTER — HOSPITAL ENCOUNTER (OUTPATIENT)
Dept: PHYSICAL THERAPY | Age: 64
Setting detail: THERAPIES SERIES
Discharge: HOME | End: 2019-03-08
Payer: COMMERCIAL

## 2019-03-08 PROCEDURE — 97112 NEUROMUSCULAR REEDUCATION: CPT

## 2019-03-08 PROCEDURE — 97530 THERAPEUTIC ACTIVITIES: CPT

## 2019-03-08 PROCEDURE — 97140 MANUAL THERAPY 1/> REGIONS: CPT

## 2019-03-08 PROCEDURE — 97110 THERAPEUTIC EXERCISES: CPT

## 2019-03-12 ENCOUNTER — HOSPITAL ENCOUNTER (OUTPATIENT)
Dept: PHYSICAL THERAPY | Age: 64
Setting detail: THERAPIES SERIES
Discharge: HOME | End: 2019-03-12
Payer: COMMERCIAL

## 2019-03-12 PROCEDURE — 97530 THERAPEUTIC ACTIVITIES: CPT

## 2019-03-12 PROCEDURE — 97140 MANUAL THERAPY 1/> REGIONS: CPT

## 2019-03-12 PROCEDURE — 97110 THERAPEUTIC EXERCISES: CPT

## 2019-03-12 PROCEDURE — 97750 PHYSICAL PERFORMANCE TEST: CPT

## 2019-03-15 ENCOUNTER — HOSPITAL ENCOUNTER (OUTPATIENT)
Dept: PHYSICAL THERAPY | Age: 64
Setting detail: THERAPIES SERIES
Discharge: HOME | End: 2019-03-15
Payer: COMMERCIAL

## 2019-03-15 PROCEDURE — 97112 NEUROMUSCULAR REEDUCATION: CPT

## 2019-03-15 PROCEDURE — 97530 THERAPEUTIC ACTIVITIES: CPT

## 2019-03-15 PROCEDURE — 97110 THERAPEUTIC EXERCISES: CPT

## 2019-03-15 PROCEDURE — 97140 MANUAL THERAPY 1/> REGIONS: CPT

## 2019-03-19 ENCOUNTER — HOSPITAL ENCOUNTER (OUTPATIENT)
Dept: PHYSICAL THERAPY | Age: 64
Setting detail: THERAPIES SERIES
Discharge: HOME | End: 2019-03-19
Payer: COMMERCIAL

## 2019-03-19 PROCEDURE — 97110 THERAPEUTIC EXERCISES: CPT

## 2019-03-19 PROCEDURE — 97112 NEUROMUSCULAR REEDUCATION: CPT

## 2019-03-19 PROCEDURE — 97140 MANUAL THERAPY 1/> REGIONS: CPT

## 2019-03-19 PROCEDURE — 97530 THERAPEUTIC ACTIVITIES: CPT

## 2019-03-22 ENCOUNTER — HOSPITAL ENCOUNTER (OUTPATIENT)
Dept: PHYSICAL THERAPY | Age: 64
Setting detail: THERAPIES SERIES
Discharge: HOME | End: 2019-03-22
Payer: COMMERCIAL

## 2019-03-22 PROCEDURE — 97112 NEUROMUSCULAR REEDUCATION: CPT

## 2019-03-22 PROCEDURE — 97530 THERAPEUTIC ACTIVITIES: CPT

## 2019-03-22 PROCEDURE — 97140 MANUAL THERAPY 1/> REGIONS: CPT

## 2019-03-22 PROCEDURE — 97110 THERAPEUTIC EXERCISES: CPT

## 2019-03-26 ENCOUNTER — HOSPITAL ENCOUNTER (OUTPATIENT)
Dept: PHYSICAL THERAPY | Age: 64
Setting detail: THERAPIES SERIES
Discharge: HOME | End: 2019-03-26
Payer: COMMERCIAL

## 2019-03-26 PROCEDURE — 97530 THERAPEUTIC ACTIVITIES: CPT

## 2019-03-26 PROCEDURE — 97140 MANUAL THERAPY 1/> REGIONS: CPT

## 2019-03-26 PROCEDURE — 97110 THERAPEUTIC EXERCISES: CPT

## 2019-03-29 ENCOUNTER — HOSPITAL ENCOUNTER (OUTPATIENT)
Dept: PHYSICAL THERAPY | Age: 64
Setting detail: THERAPIES SERIES
Discharge: HOME | End: 2019-03-29
Payer: COMMERCIAL

## 2019-03-29 PROCEDURE — 97110 THERAPEUTIC EXERCISES: CPT

## 2019-03-29 PROCEDURE — 97530 THERAPEUTIC ACTIVITIES: CPT

## 2019-04-02 ENCOUNTER — APPOINTMENT (OUTPATIENT)
Dept: PHYSICAL THERAPY | Age: 64
End: 2019-04-02
Payer: COMMERCIAL

## 2019-06-04 ENCOUNTER — OFFICE VISIT (OUTPATIENT)
Dept: ORTHOPEDIC SURGERY | Age: 64
End: 2019-06-04
Payer: COMMERCIAL

## 2019-06-04 ENCOUNTER — HOSPITAL ENCOUNTER (OUTPATIENT)
Dept: PHYSICAL THERAPY | Age: 64
Setting detail: THERAPIES SERIES
Discharge: HOME | End: 2019-06-04
Payer: COMMERCIAL

## 2019-06-04 VITALS
HEART RATE: 78 BPM | WEIGHT: 186.07 LBS | DIASTOLIC BLOOD PRESSURE: 85 MMHG | SYSTOLIC BLOOD PRESSURE: 121 MMHG | HEIGHT: 69 IN | BODY MASS INDEX: 27.56 KG/M2

## 2019-06-04 DIAGNOSIS — Z96.651 STATUS POST TOTAL RIGHT KNEE REPLACEMENT: Primary | ICD-10-CM

## 2019-06-04 DIAGNOSIS — M25.661 STIFFNESS OF RIGHT KNEE: ICD-10-CM

## 2019-06-04 PROCEDURE — 97530 THERAPEUTIC ACTIVITIES: CPT

## 2019-06-04 PROCEDURE — 97110 THERAPEUTIC EXERCISES: CPT

## 2019-06-04 PROCEDURE — 99213 OFFICE O/P EST LOW 20 MIN: CPT | Performed by: ORTHOPAEDIC SURGERY

## 2019-06-04 PROCEDURE — 97750 PHYSICAL PERFORMANCE TEST: CPT

## 2019-06-04 NOTE — PLAN OF CARE
[facility]   Physical Therapy Re-Certification Plan of Care    Dear  Dr. Dougie Peña,    We had the pleasure of treating the following patient for physical therapy services at [address] [place] [address]. A summary of our findings can be found in the updated assessment below. This includes our plan of care. If you have any questions or concerns regarding these findings, please do not hesitate to contact me at [phone]. Thank you for the referral.     Physician Signature:________________________________Date:__________________  By signing above (or electronic signature), therapists plan is approved by physician      Overall Response to Treatment:   [x]Patient is responding well to treatment and improvement is noted with regards  to goals   [x]Patient should continue to improve in reasonable time if they continue HEP   []Patient has plateaued and is no longer responding to skilled PT intervention    []Patient is getting worse and would benefit from return to referring MD   []Patient unable to adhere to initial POC   [x]Other: Patient continues to do very well now 6 months s/p R TKR and 9 months s/p Left TKR. She has regained functional AROM in both knees and has improved her extension ROM in her right knee. She still has complaints of tightness first thing in morning but her ROM measurements show she is progressively loosening up and decreasing her stiffness. Her Biodex test results today show she has regained full quad strength on left side and  Has only mild quad deficit remaining on right side when comparing to age/gender normative values, but this is expected for 6 months PO. Her hamstring strength deficit on her right side is again mild but larger / moderate on her left side. This was reviewed in detail with patient today with all questions answered. Patient will continue with her IHEP and follow up as needed moving forward.  If she has initial script for Voltaren QD>BID if needed and advised to take with pepcid/food. 2-12-19: can try restless leg cream from pharmacy, given neurontin for night and Valium if twitching awakes her  3-12-19: remaining extension deficit (-2 degrees) is okay, should not affect gait; can do drop out cast for 1 week if they like; try to decrease Voltaren to once per day       G-Code (if applicable):      Date G-Code Applied: 6/4/19      OUTCOME MEASURE DATE DEFICIT   LEFS 8/23/18 (pre-op) 79% Deficit   LEFS 8/30/18 PO 90% Deficit   LEFS 9/27/18 66% Deficit   LEFS 11/1/18 30% Deficit   LEFS 12/21/18 (PO Right knee) 89% Deficit   LEFS 1/15/19 60% Deficit   LEFS 2/12/19 46% Deficit   LEFS 3/12/19 24% Deficit   LEFS 6/4/19 15% Deficit       Progress Note: [x]  Yes  []  No  Next due by: Visit #37 (2019)       Latex Allergy:  [x]NO      []YES  Preferred Language for Healthcare:   [x]English       []other:    Visit # Insurance Allowable   27 (2019)  4 This episode (2018)  15 other (2018) 20 VPCY    10 more approved 3/1/19     Pain level:0/10 at rest, 3-4/10 at worst (gardening    SUBJECTIVE:  6 months PO. Patient reports she is doing well today. Has noticed a significant change in her symptoms in recent weeks. Has no pain in left knee and only mild pain in right knee with heavier activity I.e. Gardening or yard work. Still using weights to straight into extension a few times a day on left. No complaints of instability. Feels her right knee is starting to feel more like her left knee finally.       OBJECTIVE:       6/4/19  ROM PROM AROM Overpressure Comment     L R L R L R     Flexion     125 117   Cold  ERMI   Extension   +5 +1      0° Cold  After prop 20#                                          6/4/19  Strength L R Comment   Quad See Biodex See Biodex    Hamstring See Biodex See Biodex    Gastroc      Hip  flexion 4 4+    Hip abd 4 3+    Hip Ext 4- 4-                   3/12/19  Special Test Results/Comment   Homans Neg Temperature Neg       3/12/19  Girth R L   Mid Patella 37.7 37.0   Suprapatellar 37.5 37.1   5cm above 37.6 37.7   15cm above            TEST INITIAL  8/23/18   11/1/18 3/12/19    12 weeks PO 6/4/19 GOAL   SINGLE LEG STANCE TIME L: >20\"     R: >20\" L: >20\"    R: 14.20\" >20\" bilaterally NT >20 SECONDS   TIMED UP And GO 6.87\" 7.90\" 6.92\" NT 61-77 y/o: <9sec  66-76 y/o: <10sec  80-81 y/o: <12 sec      TEST INITIAL  12/18/18 FOLLOW  UP    3/12/19   12 weeks PO 6/4/19 GOAL   SINGLE LEG SQUAT TEST L: NT     R: POOR L: POOR +    R: POOR - L: FAIR +    R: FAIR + NO KNEE VALGUS, MEDIAL/LATERAL MOVEMENT, OR PELVIC TILT  GOOD, FAIR, POOR   6 MINUTE WALK TEST 0.20 miles 0.29 miles 0.27 miles                     M             F          60-70 y/o: >.31mile,  >.30mile  66-76 y/o: >.28 mile, >.26 mile  80-81 y/o: >.21mile,  >.20 mile   STAIR CLIMBING TEST 20.54\" 10.41\" 6\" < 13 SEC (M&F)     Score Sheet for Y Balance Test: 6/4/19   Left  Right  Difference  Comments    Anterior  90 91 +1.0    Posteromedial  117.5 115 -2.5    Posterolateral  88 92.5 +4.5           Leg Length       **Difference should be less than 4 cm for return to sport and preparticipation screening (<10% deficit compared to uninvolved)**  (Star Excursion Balance Test as a Predictor of Lower Extremity Injury in High School Basketball Players)          Isometric Strength Testing Results Summary  Knee    On 6/4/19 the patient, Chanelle Kruger, underwent an Isometric Strength Test to evaluate current status and progress of the strengthening phase of his/her current rehabilitation program.  She is currently being treated for R TKR and L TKR. Attached you will find a computer generated summary of the results which outlines the current status. To summarize these results you will find that Chanelle Kruger underwent a test measuring the strength of the knee Quadriceps and Hamstrings muscle groups at the isometric angle 60 degrees.   Standard protocol of testing is to provide pre-test stretching and warm up of both extremities followed by instruction in the test procedure and \"practice\" repetitions prior to each actual test session. The uninjured extremity undergoes the test procedure first followed by the injured extremity. Bilateral Difference:  Quadricep 11.8% [x] Deficit   [] Surplus   Hamstring 38.8% [] Deficit   [x] Surplus     Normative Data:  Quadricep Normal: 50% Patient: R: 45.8%, L: 51.8%   Hamstring Normal: 30% Patient: R: 24.7%, L: 17.1%             RESTRICTIONS/PRECAUTIONS: Bilateral Knee OA    Exercises/Interventions: *Updateed 6/4/19*  Exercise/Equipment Resistance/Repetitions Other comments   Stretching       Hamstring 3 x 30\" each    Hip Flexor     Quad 3 x 30\" each    Inclined Calf 3 x 30\"    Bike 6' Warm up                Patient Education:  8/23/18: Educated patient on all pre-op and post-op instructions including but not limited to R.I.C.E., post-op HEP, DVT prevention, warning signs of infection and DVT, activity limitations.    8/30/18: full PO instructions with emphasis on RICE and ROM  9/5/18: emphasis on knee flexion ROM 6x/day with extended holds of 1-2 minutes to improve knee flexion ROM  9/11/18: 3/8th inch heel lift provided for RLE; new HEP provided, continued emphasis on flexion ROM  9/25/18: updated HEP; encouraged patient to add quad stretch prior to all knee flexion ROM OP sets at home  11/1/18: instructed patient to ice cup over pes anserine and/or other sensitive areas 1-3x/day specifically before bed to try and alleviate pain at night  12/21/18: full PO instructions with emphasis on RICE for swelling/pain control and KF ROM  1/16/19: extensive review of HEP and frequency for both flexion and extension OP programs  3/12/19: extensive review of biodex test results; reviewed and updated HEP with education on progressions of exercises  6/4/19: full review of Biodex test results and review of HEP    HEP:  Patient instructed on HEP on this date with handout provided and all questions answered. Patient was instructed to contact PT with any complications or questions about HEP moving forward. Patient verbally stated she/he understood. Reviewed 6/4/19    Therapeutic Exercise and NMR EXR  [x] (07554) Provided verbal/tactile cueing for activities related to strengthening, flexibility, endurance, ROM for improvements in LE, proximal hip, and core control with self care, mobility, lifting, ambulation. [x] (97320) Provided verbal/tactile cueing for activities related to improving balance, coordination, kinesthetic sense, posture, motor skill, proprioception  to assist with LE, proximal hip, and core control in self care, mobility, lifting, ambulation and eccentric single leg control.      NMR and Therapeutic Activities:    [x] (45819 or 89638) Provided verbal/tactile cueing for activities related to improving balance, coordination, kinesthetic sense, posture, motor skill, proprioception and motor activation to allow for proper function of core, proximal hip and LE with self care and ADLs  [x] (55967) Gait Re-education- Provided training and instruction to the patient for proper LE, core and proximal hip recruitment and positioning and eccentric body weight control with ambulation re-education including up and down stairs     Home Exercise Program:    [x] (78656) Reviewed/Progressed HEP activities related to strengthening, flexibility, endurance, ROM of core, proximal hip and LE for functional self-care, mobility, lifting and ambulation/stair navigation   [x] (98660)Reviewed/Progressed HEP activities related to improving balance, coordination, kinesthetic sense, posture, motor skill, proprioception of core, proximal hip and LE for self care, mobility, lifting, and ambulation/stair navigation      Manual Treatments:  PROM / STM / Oscillations-Mobs:  G-I, II, III, IV (PA's, Inf., Post.)  [x] (22459) Provided manual therapy to mobilize LE, proximal

## 2019-06-04 NOTE — PROGRESS NOTES
Chief Complaint    Follow-up (RIGHT KNEE )      History of Present Illness:  Semaj Gusman is a 60 y.o. female who presents for follow up visit on her right knee. Patient is approximately 22 weeks status post a right total knee arthroplasty. She is doing extremely well. Patient states that she has achieved greater range of motion and is able to reach full extension. She notes further increases in her knee flexion. She does state that she feels that she overdid it yesterday when she was mulching to houses on her street. She states that her knees a little stiff but overall it is not causing her much pain. She was to start physical therapy and states her knees done any pain but it still some residual stiffness. She denies any new injury. She denies any calf pain. She has no other complaints at this time. Pain Assessment  Location of Pain: Knee  Location Modifiers: Right  Severity of Pain: 0  Quality of Pain: Other (Comment)(n/a)  Duration of Pain: Other (Comment)(n/a)  Frequency of Pain: Other (Comment)(n/a)  Aggravating Factors: Standing, Other (Comment)(sitting)  Relieving Factors: Ice  Result of Injury: No  Work-Related Injury: No  Are there other pain locations you wish to document?: No    Past Medical History:   Diagnosis Date    Arthritis     Dental crown present     molar    Hx of blood clots     after TKA     PONV (postoperative nausea and vomiting)         Past Surgical History:   Procedure Laterality Date    COLONOSCOPY      HYSTERECTOMY, VAGINAL      KIDNEY SURGERY Left     [place] TOTAL [place]NEE ARTHROPLASTY Left 8/27/2018    LEFT TOTAL KNEE ARTHROPLASTY performed by Burton Miller MD at [facility] [address] Right 12/19/2018    RIGHT TOTAL KNEE ARTHROPLASTY performed by Burton Miller MD at [address] EXTRACTION         No family history on file.     Social History     Socioeconomic History    Marital status:      Spouse name: None    Number of children: None    Years of education: None    Highest education level: None   Occupational History    None   Social Needs    Financial resource strain: None    Food insecurity:     Worry: None     Inability: None    Transportation needs:     Medical: None     Non-medical: None   Tobacco Use    Smoking status: Never Smoker    Smokeless tobacco: Never Used   Substance and Sexual Activity    Alcohol use: No    Drug use: No    Sexual activity: None   Lifestyle    Physical activity:     Days per week: None     Minutes per session: None    Stress: None   Relationships    Social connections:     Talks on phone: None     Gets together: None     Attends L[employer] service: None     Active member of club or organization: None     Attends meetings of clubs or organizations: None     Relationship status: None    Intimate partner violence:     Fear of current or ex partner: None     Emotionally abused: None     Physically abused: None     Forced sexual activity: None   Other Topics Concern    None   Social History Narrative    None       Current Outpatient Medications   Medication Sig Dispense Refill    diclofenac (VOLTAREN) 75 MG EC tablet Take 1 tablet by mouth 2 times daily (with meals) 60 tablet 1    acetaminophen (TYLENOL) 325 MG tablet Take 2,000 mg by mouth every 4 hours as needed for Pain Every 4-6 hrs. States Dr Meg Mccrary at [facility] said she can take this much      Multiple Vitamins-Minerals (MULTIVITAMIN WOMEN 50+ PO) Take by mouth daily      warfarin (COUMADIN) 2.5 MG tablet 2 tabs daily (Patient taking differently: 5 mg every evening 2 tabs daily) 40 tablet 1    Cetirizine HCl (ZYRTEC ALLERGY PO) Take 10 mg by mouth as needed Seasonal      gabapentin (NEURONTIN) 300 MG capsule 1 tablet daily. 10 capsule 3     No current facility-administered medications for this visit.         No Known Allergies    Review of Systems:  A 14 point review of systems was completed by the patient and is available in the media section of the scanned medical record and was reviewed on 6/4/2019. The review is negative with the exception of those things mentioned in the HPI and Past Medical History   Review of Systems   Musculoskeletal: Negative for arthralgias and myalgias. Neurological: Negative for weakness and numbness. Vital Signs:   /85   Pulse 78   Ht 5' 9.02\" (1.753 m)   Wt 186 lb 1.1 oz (84.4 kg)   BMI 27.46 kg/m²     General Exam:    Neuro: Alert & Oriented x 3,  normal,  no focal deficits noted. Normal mood & affect. Eyes: Sclera clear  Ears: Normal external ear  Mouth:  No perioral lesions  Pulm: Respirations unlabored and regular   Skin: Warm, well perfused    Knee Examination: Right    Skin:  There are no skin lesions, cellulitis, or extreme edema in the lower extremities. Sensation is grossly intact to light touch bilaterally lower extremity. The patient has warm and well-perfused Bilateral     Inspection:  Well-healed anterior midline surgical scar. No effusion, ecchymosis, discoloration, erythema, excessive warmth. no gross deformities, no signs of infection. Palpation: There is no patellofemoral crepitus, there is no joint line tenderness. No other osseous or soft tissue tenderness around the knee. Negative calf tenderness    Range of Motion:  0-115 degrees without pain. Mild stiffness with extension. Patient is able to achieve further passive range of motion with knee flexion    Strength:  4/5 quadriceps, 4/5 hamstrings    Special Tests:  No ligament instability, valgus and varus stress test are stable at 0 and 30°. Lachman's exhibits a solid endpoint. Negative Homans sign    Gait:  Steady, Non antalgic, without the use of assistive devices    Alignment: no Varus deformity    Contralateral Knee Comparison Examination: Left    Skin:  There are no skin lesions, cellulitis, or extreme edema in the lower extremities.  Sensation is grossly intact to light touch (DRAGON) and it was checked for errors. It is possible that there are still dictated errors within this office note. If so, please bring any errors to my attention for an addendum. All efforts were made to ensure that this office note is accurate. This dictation was performed with a verbal recognition program (DRAGON) and it was checked for errors. It is possible that there are still dictated errors within this office note. If so, please bring any errors to my attention for an addendum. All efforts were made to ensure that this office note is accurate. Supervising Physician Attestation:  I, Dr. Caitlin Koroma, personally performed the services described in this documentation as scribed above, and it is both accurate and complete and I agree with all pertinent clinical information. I personally interviewed the patient and performed a physical examination and medical decision making. I discussed the patient's condition and treatment options and have  reviewed and agree with the past medical, family and social history unless otherwise noted. All of the patient's questions were answered.       Board Certified Orthopaedic Surgeon  [facility] and [address]  [facility] and [address] [facility] [employer]  Professor of [address]

## 2019-12-05 ENCOUNTER — OFFICE VISIT (OUTPATIENT)
Dept: ORTHOPEDIC SURGERY | Age: 64
End: 2019-12-05

## 2019-12-05 ENCOUNTER — HOSPITAL ENCOUNTER (OUTPATIENT)
Dept: PHYSICAL THERAPY | Age: 64
Setting detail: THERAPIES SERIES
Discharge: HOME | End: 2019-12-05
Payer: COMMERCIAL

## 2019-12-05 VITALS
HEART RATE: 62 BPM | SYSTOLIC BLOOD PRESSURE: 128 MMHG | DIASTOLIC BLOOD PRESSURE: 68 MMHG | BODY MASS INDEX: 27.56 KG/M2 | WEIGHT: 186.07 LBS | HEIGHT: 69 IN

## 2019-12-05 DIAGNOSIS — Z96.651 STATUS POST TOTAL RIGHT KNEE REPLACEMENT: Primary | ICD-10-CM

## 2019-12-05 PROCEDURE — 97110 THERAPEUTIC EXERCISES: CPT

## 2019-12-05 PROCEDURE — 99999 PR OFFICE/OUTPT VISIT,PROCEDURE ONLY: CPT | Performed by: ORTHOPAEDIC SURGERY

## 2019-12-05 PROCEDURE — 97530 THERAPEUTIC ACTIVITIES: CPT

## 2019-12-05 PROCEDURE — 97750 PHYSICAL PERFORMANCE TEST: CPT

## 2019-12-05 ASSESSMENT — ENCOUNTER SYMPTOMS
ALLERGIC/IMMUNOLOGIC NEGATIVE: 1
RESPIRATORY NEGATIVE: 1
EYES NEGATIVE: 1
GASTROINTESTINAL NEGATIVE: 1

## (undated) DEVICE — 2108 SERIES SAGITTAL BLADE (19.5 X 1.27 X 90.0MM)

## (undated) DEVICE — Z INACTIVE NO SUPPLIER SOLUTIONIRRIG 3000ML 0.9% SOD CHL FLX CONT [79720808] [HOSPIRA WORLDWIDE INC]

## (undated) DEVICE — SST TWIST DRILL, STANDARD, 3.2MM DIA. X 127MM: Brand: MICROAIRE®

## (undated) DEVICE — Z DISCONTINUED PER MEDLINE USE 2741943 DRESSING AQUACEL 10 IN ALG W9XL25CM SIL CVR WTRPRF VIR BACT BARR ANTIMIC

## (undated) DEVICE — NON RIMMED SPEED PIN 65MM STERILE

## (undated) DEVICE — ELECTRODE PT RET AD L9FT HI MOIST COND ADH HYDRGEL CORDED

## (undated) DEVICE — GOWN,SIRUS,POLYRNF,SETINSLV,XL,20/CS: Brand: MEDLINE

## (undated) DEVICE — NON STERILE VISIONAIRE ADAPTIVE                                    GUIDE KIT JOURNEY II: Brand: VISIONAIRE

## (undated) DEVICE — GLOVE SURG SZ 85 L12IN FNGR THK75MIL WHT LTX POLYMER BEAD

## (undated) DEVICE — HIGH FLOW TIP

## (undated) DEVICE — TURNOVER KIT RM INF CTRL TECH

## (undated) DEVICE — DECANTER BAG 9": Brand: MEDLINE INDUSTRIES, INC.

## (undated) DEVICE — HANDPIECE SUCTION TUBING INTERPULSE 10FT

## (undated) DEVICE — YANKAUER SUCTION INSTRUMENT WITHOUT CONTROL VENT, OPEN TIP, CLEAR: Brand: YANKAUER

## (undated) DEVICE — RIMMED SPEED PIN 45MM STERILE

## (undated) DEVICE — PACK PROCEDURE SURG TOTAL KNEE

## (undated) DEVICE — CANNULA NSL AD TBNG L7FT PVC STR NONFLARED PRNG O2 DEL W STD

## (undated) DEVICE — DUAL CUT SAGITTAL BLADE

## (undated) DEVICE — INTENDED FOR TISSUE SEPARATION, AND OTHER PROCEDURES THAT REQUIRE A SHARP SURGICAL BLADE TO PUNCTURE OR CUT.: Brand: BARD-PARKER ® CARBON RIB-BACK BLADES

## (undated) DEVICE — PACK,BASIC: Brand: MEDLINE

## (undated) DEVICE — STERILE POLYISOPRENE POWDER-FREE SURGICAL GLOVES WITH EMOLLIENT COATING: Brand: PROTEXIS

## (undated) DEVICE — SUTURE VCRL SZ 0 L18IN ABSRB UD L36MM CT-1 1/2 CIR J840D

## (undated) DEVICE — CHLORAPREP 26ML ORANGE

## (undated) DEVICE — 450 ML BOTTLE OF 0.05% CHLORHEXIDINE GLUCONATE IN 99.95% STERILE WATER FOR IRRIGATION, USP AND APPLICATOR.: Brand: IRRISEPT ANTIMICROBIAL WOUND LAVAGE

## (undated) DEVICE — SOLUTION IV 1000ML 0.9% SOD CHL

## (undated) DEVICE — GARMENT,MEDLINE,DVT,INT,CALF,MED, GEN2: Brand: MEDLINE

## (undated) DEVICE — SUTURE MCRYL SZ 4-0 L27IN ABSRB UD L19MM PS-2 1/2 CIR PRIM Y426H

## (undated) DEVICE — GLOVE SURG SZ 9 L12IN FNGR THK87MIL DK GRN LTX POLYMER W

## (undated) DEVICE — SURE SET-DOUBLE BASIN-LF: Brand: MEDLINE INDUSTRIES, INC.

## (undated) DEVICE — 60 ML SYRINGE,CATHETER TIP: Brand: MONOJECT

## (undated) DEVICE — SPONGE,LAP,18"X18",DLX,XR,ST,5/PK,40/PK: Brand: MEDLINE

## (undated) DEVICE — SOLUTION IV 100ML 0.9% SOD CHL PH5 CONTAIN DEHP VIAFLX QP

## (undated) DEVICE — 3 BONE CEMENT MIXER: Brand: MIXEVAC

## (undated) DEVICE — FLUID TRAP FOR MINIVAC ES EQUIP FLD TRAP

## (undated) DEVICE — GOWN,SIRUS,POLYRNF,SETINSLV,L,20/CS: Brand: MEDLINE

## (undated) DEVICE — SYRINGE 60ML IRRIG PISTON TOMEY

## (undated) DEVICE — GOWN,SIRUS,POLYRNF,BRTHSLV,XLN/XXL,18/CS: Brand: MEDLINE

## (undated) DEVICE — SST BUR, WIRE PASS DRILL, 2 FLUTES, MED., 2MM DIA.: Brand: MICROAIRE®

## (undated) DEVICE — COVER LT HNDL BLU PLAS

## (undated) DEVICE — STRIP,CLOSURE,WOUND,MEDI-STRIP,1/2X4: Brand: MEDLINE

## (undated) DEVICE — SUTURE ETHBND EXCEL SZ 0 L18IN NONABSORBABLE GRN L36MM CT-1 CX21D

## (undated) DEVICE — BLADE ES L2.75IN ELASTOMERIC COAT DURABLE BEND UPTO 90DEG

## (undated) DEVICE — GLOVE SURG SZ 9 L12IN FNGR THK75MIL WHT LTX POLYMER BEAD

## (undated) DEVICE — SURGICAL SET UP - SURE SET: Brand: MEDLINE INDUSTRIES, INC.

## (undated) DEVICE — GUIDE SURG CUT FOR CAPPED VISIONAIRE

## (undated) DEVICE — PENCIL ES ULT VAC W TELSCP NOSE EZ CLN BLDE 10FT

## (undated) DEVICE — ORTHO PRE OP PACK: Brand: MEDLINE INDUSTRIES, INC.

## (undated) DEVICE — SUTURE MCRYL SZ 2-0 L18IN ABSRB VLT L36MM CT-1 1/2 CIR Y739D

## (undated) DEVICE — SUTURE VCRL UD BR CT 3-0 18IN CT1 J838D

## (undated) DEVICE — PENCIL SMK EVAC ALL IN 1 DSGN ENH VISIBILITY IMPROVED AIR

## (undated) DEVICE — COVER,MAYO STAND,XL,STERILE: Brand: MEDLINE